# Patient Record
Sex: FEMALE | Race: WHITE | NOT HISPANIC OR LATINO | Employment: OTHER | URBAN - METROPOLITAN AREA
[De-identification: names, ages, dates, MRNs, and addresses within clinical notes are randomized per-mention and may not be internally consistent; named-entity substitution may affect disease eponyms.]

---

## 2021-07-06 ENCOUNTER — EVALUATION (OUTPATIENT)
Dept: PHYSICAL THERAPY | Facility: CLINIC | Age: 71
End: 2021-07-06
Payer: MEDICARE

## 2021-07-06 DIAGNOSIS — I89.0 LYMPHEDEMA OF BOTH LOWER EXTREMITIES: Primary | ICD-10-CM

## 2021-07-06 PROCEDURE — 97162 PT EVAL MOD COMPLEX 30 MIN: CPT | Performed by: PHYSICAL THERAPIST

## 2021-07-06 NOTE — PROGRESS NOTES
PT Evaluation     Today's date: 2021  Patient name: Glendy Ferrara  : 1950  MRN: [id number]  Referring provider: Henri Ha DPM  Dx:   Encounter Diagnosis     ICD-10-CM    1  Lymphedema of both lower extremities  I89 0                   Assessment  Assessment details: Pt presents w/ B LE edema with moderate pitting, blisters on BL anterior shins, and slight skin fibrosis, predominant involvement of B lower legs distal 2/3rds into ankles   R>L girth measurements are noted which fits visual inspection of R>L LE size  PMHx significant for DM2 and other medical history which could have been an indicator and potential compromise of LE lymphatics   Advise initiating full CDT protocol w/ MLD and compression wraps  PT reviewed with patient lymphedema education of skin care including: keeping extremity clean and dry, applying moisturizer daily, special attention to nail care, wearing sunscreen avoiding nicks and skin irritation from razors, wearing gloves with activities that may cause skin injury  Also reviewed for patient to avoid excessive constriction such as tight clothing and jewelry, extreme temperature changes, and the importance of compliance for compression garments  Patient also advised to contact physician if experiencing any constitutional symptoms, swelling, redness, pain, rash, itching, or increased skin temperature       Impairments: abnormal or restricted ROM, activity intolerance, impaired physical strength, lacks appropriate home exercise program and pain with function    Symptom irritability: moderateUnderstanding of Dx/Px/POC: excellent  Goals  ST  Reduce B LE edema >1 cm girth measurement within 2 wks  2  Pt to obtain compression wraps within 1 wk    LT  Reduce B LE girth measurements > 2 cm within 5 wks  2  Pt I in self MLD and transition to phase 2 CDT protocol within 5 wks  3  Pt to report no blisters or increased redness in BL LE by DC       Plan  Patient would benefit from: skilled physical therapy  Planned therapy interventions: abdominal trunk stabilization, manual therapy, massage, neuromuscular re-education, patient education, strengthening, stretching, therapeutic activities, therapeutic exercise, flexibility, gait training and home exercise program  Other planned therapy interventions: MLD, compression wraps   Frequency: 2x week  Duration in weeks: 4  Treatment plan discussed with: patient and family        Subjective Evaluation    History of Present Illness  Mechanism of injury: Pt notes that she has been noticing more swelling in her legs the last year or two  She notes that the blisters have been consistent on the anterior aspect of both of her lower legs  She notes her legs feel heavy if she walks for long periods of time  She does have DM2, but controlled  Pt reports no problems sleeping, but has difficulty bending over sometimes  Denies any numbness/tingling in lower legs  Pt states that she has not had any wounds, but the blisters have not been going away  She states that she does have compression socks, but has not been wearing them      Quality of life: excellent    Pain  Current pain ratin  At best pain ratin  At worst pain ratin  Location: R foot- pt reports shooting pain once in awhile  Aggravating factors: standing and walking    Social Support  Steps to enter house: yes (2 small steps with railing)  Stairs in house: yes (2 to get into the kitchen)   Lives in: multiple-level home  Lives with: spouse and adult children      Diagnostic Tests  No diagnostic tests performed  Treatments  No previous or current treatments  Patient Goals  Patient goals for therapy: decreased edema, decreased pain, increased strength and independence with ADLs/IADLs          Objective       LOWER EXTREMITY LEFT CALCULATIONS      Most Recent Value   Volume LE (mL)  7867   Difference from last visit (mL)   -7867   Difference from first visit (mL)   -7411      LOWER EXTREMITY RIGHT CALCULATIONS      Most Recent Value   Volume LE (mL)  9341   Difference from last visit (mL)   -9341   Difference from first visit (mL)   -9341            Physical assessment: B/L swelling R>L, from ankles to below knee  Palpation: Moderate heaviness B lower legs, worse on R  Skin Mobility: minimal-moderate fibrosis on anterior shins BL  Skin color: increased redness in BL anterior shins  Pittin+  Wound presence: no wounds present, but multiple blisters on BL anterior shins  Wound size: n/a  Wound color: n/a  Stemmer's Sign: (-)     Transfer status: I   Ability to don/doff clothing/garments: I but states that it takes her increased time and is difficult    Toe nail hygiene: normal         Precautions: HNT, DM2, fatty liver     Daily Treatment Diary:      Initial Evaluation Date: 21  Compliance                      Visit Number 1                    Re-Eval  IE                 [facility]   Foto Captured Y                                                Manual                      BL LE MLD -                     Application of compression wraps BL LE -                                           Ther-Ex                                                                                                                                                                                                                                                  Neuro Re-Ed                                                                                                Ther-Act                                                               Modalities

## 2021-07-06 NOTE — LETTER
2021    Thalia Groves DPM  [address] [address]    Patient: Erik Willis   YOB: 1950   Date of Visit: 2021     Encounter Diagnosis     ICD-10-CM    1  Lymphedema of both lower extremities  I89 0        Dear Dr Giovanna Moore: Thank you for your recent referral of Erik Willis  Please review the attached evaluation summary from Elisa's recent visit  Please verify that you agree with the plan of care by signing the attached order  If you have any questions or concerns, please do not hesitate to call  I sincerely appreciate the opportunity to share in the care of one of your patients and hope to have another opportunity to work with you in the near future  Sincerely,    Abbi Martinez, PT      Referring Provider:      I certify that I have read the below Plan of Care and certify the need for these services furnished under this plan of treatment while under my care  Thalia Groves DPM  [address] [address]  Via Fax: [phone]          PT Evaluation     Today's date: 2021  Patient name: Erik Willis  : 1950  MRN: [id number]  Referring provider: Kesha Lara DPM  Dx:   Encounter Diagnosis     ICD-10-CM    1  Lymphedema of both lower extremities  I89 0                   Assessment  Assessment details: Pt presents w/ B LE edema with moderate pitting, blisters on BL anterior shins, and slight skin fibrosis, predominant involvement of B lower legs distal 2/3rds into ankles   R>L girth measurements are noted which fits visual inspection of R>L LE size  PMHx significant for DM2 and other medical history which could have been an indicator and potential compromise of LE lymphatics   Advise initiating full CDT protocol w/ MLD and compression wraps   PT reviewed with patient lymphedema education of skin care including: keeping extremity clean and dry, applying moisturizer daily, special attention to nail care, wearing sunscreen avoiding nicks and skin irritation from razors, wearing gloves with activities that may cause skin injury  Also reviewed for patient to avoid excessive constriction such as tight clothing and jewelry, extreme temperature changes, and the importance of compliance for compression garments  Patient also advised to contact physician if experiencing any constitutional symptoms, swelling, redness, pain, rash, itching, or increased skin temperature       Impairments: abnormal or restricted ROM, activity intolerance, impaired physical strength, lacks appropriate home exercise program and pain with function    Symptom irritability: moderateUnderstanding of Dx/Px/POC: excellent  Goals  ST  Reduce B LE edema >1 cm girth measurement within 2 wks  2  Pt to obtain compression wraps within 1 wk    LT  Reduce B LE girth measurements > 2 cm within 5 wks  2  Pt I in self MLD and transition to phase 2 CDT protocol within 5 wks  3  Pt to report no blisters or increased redness in BL LE by DC  Plan  Patient would benefit from: skilled physical therapy  Planned therapy interventions: abdominal trunk stabilization, manual therapy, massage, neuromuscular re-education, patient education, strengthening, stretching, therapeutic activities, therapeutic exercise, flexibility, gait training and home exercise program  Other planned therapy interventions: MLD, compression wraps   Frequency: 2x week  Duration in weeks: 4  Treatment plan discussed with: patient and family        Subjective Evaluation    History of Present Illness  Mechanism of injury: Pt notes that she has been noticing more swelling in her legs the last year or two  She notes that the blisters have been consistent on the anterior aspect of both of her lower legs  She notes her legs feel heavy if she walks for long periods of time  She does have DM2, but controlled  Pt reports no problems sleeping, but has difficulty bending over sometimes  Denies any numbness/tingling in lower legs  Pt states that she has not had any wounds, but the blisters have not been going away  She states that she does have compression socks, but has not been wearing them  Quality of life: excellent    Pain  Current pain ratin  At best pain ratin  At worst pain ratin  Location: R foot- pt reports shooting pain once in awhile  Aggravating factors: standing and walking    Social Support  Steps to enter house: yes (2 small steps with railing)  Stairs in house: yes (2 to get into the kitchen)   Lives in: multiple-level home  Lives with: spouse and adult children      Diagnostic Tests  No diagnostic tests performed  Treatments  No previous or current treatments  Patient Goals  Patient goals for therapy: decreased edema, decreased pain, increased strength and independence with ADLs/IADLs          Objective       LOWER EXTREMITY LEFT CALCULATIONS      Most Recent Value   Volume LE (mL)  7867   Difference from last visit (mL)   -7867   Difference from first visit (mL)   -7867      LOWER EXTREMITY RIGHT CALCULATIONS      Most Recent Value   Volume LE (mL)  9341   Difference from last visit (mL)   -9341   Difference from first visit (mL)   -9341            Physical assessment: B/L swelling R>L, from ankles to below knee  Palpation: Moderate heaviness B lower legs, worse on R  Skin Mobility: minimal-moderate fibrosis on anterior shins BL  Skin color: increased redness in BL anterior shins  Pittin+  Wound presence: no wounds present, but multiple blisters on BL anterior shins  Wound size: n/a  Wound color: n/a  Stemmer's Sign: (-)     Transfer status: I   Ability to don/doff clothing/garments: I but states that it takes her increased time and is difficult    Toe nail hygiene: normal         Precautions: HNT, DM2, fatty liver     Daily Treatment Diary:      Initial Evaluation Date: 21  Compliance                      Visit Number 1                    Re-Eval  IE [address] Captured Y                           7/6                     Manual                      BL LE MLD -                     Application of compression wraps SUDARSHAN LE -                                           Ther-Ex                                                                                                                                                                                                                                                  Neuro Re-Ed                                                                                                Ther-Act                                                               Modalities

## 2021-07-13 ENCOUNTER — APPOINTMENT (OUTPATIENT)
Dept: PHYSICAL THERAPY | Facility: CLINIC | Age: 71
End: 2021-07-13
Payer: MEDICARE

## 2021-07-14 ENCOUNTER — OFFICE VISIT (OUTPATIENT)
Dept: PHYSICAL THERAPY | Facility: CLINIC | Age: 71
End: 2021-07-14
Payer: MEDICARE

## 2021-07-14 DIAGNOSIS — I89.0 LYMPHEDEMA OF BOTH LOWER EXTREMITIES: Primary | ICD-10-CM

## 2021-07-14 PROCEDURE — 97140 MANUAL THERAPY 1/> REGIONS: CPT | Performed by: PHYSICAL THERAPIST

## 2021-07-14 NOTE — PROGRESS NOTES
Daily Note     Today's date: 2021  Patient name: Michelle Lawrence  : 1950  MRN: [id number]  Referring provider: Nieves Ortega DPM  Dx:   Encounter Diagnosis     ICD-10-CM    1  Lymphedema of both lower extremities  I89 0                   Subjective: Pt notes that he got her bandages yesterday in the mail  Objective: See treatment diary below      Assessment: Tolerated treatment well  Pt tolerated modified MLD to BL LE  Blisters still present on anterior shins, worse on her R LE  Applied farrow wraps to BL LE, and educated pt and son how to properly don/doff them  Discussed adverse s/s of wraps, pt had no questions at this time  Patient would benefit from continued PT to address current limitations  Plan: Continue per plan of care             Precautions: HNT, DM2, fatty liver     Daily Treatment Diary:      Initial Evaluation Date: 21  Compliance                    Visit Number 1 2                   Re-Eval  IE -                MC   Foto Captured Y -                                             Manual                      BL LE MLD -  arb 30'                   Application of compression wraps BL LE - Arb 10'                                         Ther-Ex                                                                                                                                                                                                                                                  Neuro Re-Ed                                                                                                Ther-Act                                                               Modalities

## 2021-07-21 ENCOUNTER — OFFICE VISIT (OUTPATIENT)
Dept: PHYSICAL THERAPY | Facility: CLINIC | Age: 71
End: 2021-07-21
Payer: MEDICARE

## 2021-07-21 DIAGNOSIS — I89.0 LYMPHEDEMA OF BOTH LOWER EXTREMITIES: Primary | ICD-10-CM

## 2021-07-21 PROCEDURE — 97140 MANUAL THERAPY 1/> REGIONS: CPT | Performed by: PHYSICAL THERAPIST

## 2021-07-21 NOTE — PROGRESS NOTES
Daily Note     Today's date: 2021  Patient name: Jeimy Álvarez  : 1950  MRN: [id number]  Referring provider: Lita Luong DPM  Dx:   Encounter Diagnosis     ICD-10-CM    1  Lymphedema of both lower extremities  I89 0                   Subjective: Pt notes that she was able to wear her compression wraps for a whole day last week following the session  She notes that her swelling in her R LE feels a little more today  Objective: See treatment diary below      Assessment: Tolerated treatment well  Pt's blisters on anterior shins BL and redness looked improved today  Swelling still prominent in BL lower legs and into ankles  She tolerated modified MLD without adverse effects  Applied compression wraps to BL lower legs and ankles today, she noted relief following application  Patient would benefit from continued PT to address swelling in BL lower legs  Plan: Continue per plan of care        Precautions: HNT, DM2, fatty liver     Daily Treatment Diary:      Initial Evaluation Date: 21  Compliance                  Visit Number 1 2  3                 Re-Eval  IE -  -              MC   Foto Captured Y - -                                         Manual                      BL LE MLD -  arb 30'  arb 30'                 Application of compression wraps BL LE - Arb 10'  arb 10'                                       Ther-Ex                                                                                                                                                                                                                                                  Neuro Re-Ed                                                                                                Ther-Act                                                               Modalities

## 2021-07-27 ENCOUNTER — OFFICE VISIT (OUTPATIENT)
Dept: PHYSICAL THERAPY | Facility: CLINIC | Age: 71
End: 2021-07-27
Payer: MEDICARE

## 2021-07-27 DIAGNOSIS — I89.0 LYMPHEDEMA OF BOTH LOWER EXTREMITIES: Primary | ICD-10-CM

## 2021-07-27 PROCEDURE — 97140 MANUAL THERAPY 1/> REGIONS: CPT | Performed by: PHYSICAL THERAPIST

## 2021-07-27 NOTE — PROGRESS NOTES
Daily Note     Today's date: 2021  Patient name: Demi Farrar  : 1950  MRN: [id number]  Referring provider: Anny Lopez DPM  Dx:   Encounter Diagnosis     ICD-10-CM    1  Lymphedema of both lower extremities  I89 0                   Subjective: Pt notes that she still is working on building up her tolerance to wear the wraps all day  She gets to a point in the day that they feel like they are pinching, so she has to take them off  Objective: See treatment diary below      Assessment: Tolerated treatment well  Redness was less today in BL lower legs  Continues to have increased swelling around her ankles BL  Pt educated pt to start a wearing schedule for wraps to help with tolerance, she verbalized understanding  Patient would benefit from continued PT to address current limitations  Plan: Continue per plan of care        Precautions: HNT, DM2, fatty liver     Daily Treatment Diary:      Initial Evaluation Date: 21  Compliance                Visit Number 1 2  3  4               Re-Eval  IE -  -  -            MC   Foto Captured Y - -  -                                     Manual                      BL LE MLD -  arb 30'  arb 30'  arb 30'               Application of compression wraps BL LE - Arb 10'  arb 10'  arb 10'                                      Ther-Ex                                                                                                                                                                                                                                                  Neuro Re-Ed                                                                                                Ther-Act                                                               Modalities

## 2021-08-03 ENCOUNTER — OFFICE VISIT (OUTPATIENT)
Dept: PHYSICAL THERAPY | Facility: CLINIC | Age: 71
End: 2021-08-03
Payer: MEDICARE

## 2021-08-03 DIAGNOSIS — I89.0 LYMPHEDEMA OF BOTH LOWER EXTREMITIES: Primary | ICD-10-CM

## 2021-08-03 PROCEDURE — 97140 MANUAL THERAPY 1/> REGIONS: CPT | Performed by: PHYSICAL THERAPIST

## 2021-08-03 NOTE — PROGRESS NOTES
Daily Note/ DC Summary     Today's date: 8/3/2021  Patient name: Ulices Carrasquillo  : 1950  MRN: [id number]  Referring provider: Lilibeth Patterson DPM  Dx:   Encounter Diagnosis     ICD-10-CM    1  Lymphedema of both lower extremities  I89 0                   Subjective: Pt notes that her legs are improving and has been consistently wearing her wraps  Objective: See treatment diary below    LOWER EXTREMITY LEFT CALCULATIONS      Most Recent Value   Volume LE (mL)  7457   Difference from last visit (mL)   410   Difference from first visit (mL)   410   Difference from last visit (%)   5   Difference from first visit (%)   5      LOWER EXTREMITY RIGHT CALCULATIONS      Most Recent Value   Volume LE (mL)  8496   Difference from last visit (mL)   845   Difference from first visit (mL)   845   Difference from last visit (%)   9   Difference from first visit (%)   9            Assessment: Tolerated treatment well  Took measurements today; improvement noted in BL legs with improved skin mobility and color  Pt has other medical issues going on at this time and would like to focus on those issues right now  She states that she is comfortable wearing her wraps daily and will call the clinic with any future questions or concerns  Plan: Pt to be DC from PT formally following today's session        Precautions: HNT, DM2, fatty liver     Daily Treatment Diary:      Initial Evaluation Date: 21  Compliance 7/6  7/14  7/21  7/27  8/3             Visit Number 1 2  3  4  5             Re-Eval  IE -  -  -  -          MC   Foto Captured Y - -  -  Y                      8/3             Manual                      BL LE MLD -  arb 30'  arb 30'  arb 30'  arb 30'             Application of compression wraps BL LE - Arb 10'  arb 10'  arb 10'   arb 10'             Measurements          10'             Ther-Ex Neuro Re-Ed                                                                                                Ther-Act                                                               Modalities

## 2023-01-01 ENCOUNTER — APPOINTMENT (INPATIENT)
Dept: NON INVASIVE DIAGNOSTICS | Facility: HOSPITAL | Age: 73
DRG: 441 | End: 2023-01-01
Payer: MEDICARE

## 2023-01-01 ENCOUNTER — HOSPITAL ENCOUNTER (INPATIENT)
Facility: HOSPITAL | Age: 73
LOS: 3 days | DRG: 441 | End: 2023-09-15
Attending: EMERGENCY MEDICINE | Admitting: ANESTHESIOLOGY
Payer: MEDICARE

## 2023-01-01 ENCOUNTER — APPOINTMENT (INPATIENT)
Dept: RADIOLOGY | Facility: HOSPITAL | Age: 73
DRG: 441 | End: 2023-01-01
Payer: MEDICARE

## 2023-01-01 ENCOUNTER — APPOINTMENT (EMERGENCY)
Dept: CT IMAGING | Facility: HOSPITAL | Age: 73
DRG: 441 | End: 2023-01-01
Payer: MEDICARE

## 2023-01-01 ENCOUNTER — APPOINTMENT (INPATIENT)
Dept: CT IMAGING | Facility: HOSPITAL | Age: 73
DRG: 441 | End: 2023-01-01
Payer: MEDICARE

## 2023-01-01 ENCOUNTER — APPOINTMENT (INPATIENT)
Dept: INTERVENTIONAL RADIOLOGY/VASCULAR | Facility: HOSPITAL | Age: 73
DRG: 441 | End: 2023-01-01
Attending: HOSPITALIST
Payer: MEDICARE

## 2023-01-01 ENCOUNTER — APPOINTMENT (EMERGENCY)
Dept: RADIOLOGY | Facility: HOSPITAL | Age: 73
DRG: 441 | End: 2023-01-01
Payer: MEDICARE

## 2023-01-01 VITALS
OXYGEN SATURATION: 96 % | DIASTOLIC BLOOD PRESSURE: 71 MMHG | RESPIRATION RATE: 31 BRPM | SYSTOLIC BLOOD PRESSURE: 100 MMHG | BODY MASS INDEX: 39.17 KG/M2 | WEIGHT: 249.56 LBS | HEART RATE: 123 BPM | TEMPERATURE: 96.9 F | HEIGHT: 67 IN

## 2023-01-01 DIAGNOSIS — J96.01 ACUTE RESPIRATORY FAILURE WITH HYPOXIA (HCC): Primary | ICD-10-CM

## 2023-01-01 DIAGNOSIS — K72.90 DECOMPENSATED HEPATIC CIRRHOSIS (HCC): ICD-10-CM

## 2023-01-01 DIAGNOSIS — K76.7 HEPATORENAL SYNDROME (HCC): ICD-10-CM

## 2023-01-01 DIAGNOSIS — N17.9 AKI (ACUTE KIDNEY INJURY) (HCC): ICD-10-CM

## 2023-01-01 DIAGNOSIS — K74.60 DECOMPENSATED HEPATIC CIRRHOSIS (HCC): ICD-10-CM

## 2023-01-01 DIAGNOSIS — R18.8 ASCITES: ICD-10-CM

## 2023-01-01 DIAGNOSIS — E86.0 DEHYDRATION: ICD-10-CM

## 2023-01-01 LAB
2HR DELTA HS TROPONIN: -1 NG/L
4HR DELTA HS TROPONIN: -1 NG/L
ALBUMIN FLD-MCNC: <1.5 G/DL
ALBUMIN SERPL BCP-MCNC: 2.9 G/DL (ref 3.5–5)
ALBUMIN SERPL BCP-MCNC: 2.9 G/DL (ref 3.5–5)
ALBUMIN SERPL BCP-MCNC: 3.7 G/DL (ref 3.5–5)
ALP SERPL-CCNC: 104 U/L (ref 34–104)
ALP SERPL-CCNC: 105 U/L (ref 34–104)
ALP SERPL-CCNC: 116 U/L (ref 34–104)
ALT SERPL W P-5'-P-CCNC: 44 U/L (ref 7–52)
ALT SERPL W P-5'-P-CCNC: 45 U/L (ref 7–52)
ALT SERPL W P-5'-P-CCNC: 53 U/L (ref 7–52)
AMMONIA PLAS-SCNC: 58 UMOL/L (ref 18–72)
AMMONIA PLAS-SCNC: 92 UMOL/L (ref 18–72)
AMMONIA PLAS-SCNC: 99 UMOL/L (ref 18–72)
ANION GAP SERPL CALCULATED.3IONS-SCNC: 14 MMOL/L
ANION GAP SERPL CALCULATED.3IONS-SCNC: 15 MMOL/L
ANION GAP SERPL CALCULATED.3IONS-SCNC: 15 MMOL/L
ANION GAP SERPL CALCULATED.3IONS-SCNC: 16 MMOL/L
ANION GAP SERPL CALCULATED.3IONS-SCNC: 17 MMOL/L
ANION GAP SERPL CALCULATED.3IONS-SCNC: 18 MMOL/L
ANION GAP SERPL CALCULATED.3IONS-SCNC: 20 MMOL/L
ANISOCYTOSIS BLD QL SMEAR: PRESENT
AORTIC ROOT: 3.5 CM
AORTIC VALVE MEAN VELOCITY: 10.2 M/S
APICAL FOUR CHAMBER EJECTION FRACTION: 48 %
APPEARANCE FLD: CLEAR
APTT PPP: 23 SECONDS (ref 23–37)
ARTERIAL PATENCY WRIST A: NO
ARTERIAL PATENCY WRIST A: YES
AST SERPL W P-5'-P-CCNC: 114 U/L (ref 13–39)
AST SERPL W P-5'-P-CCNC: 88 U/L (ref 13–39)
AST SERPL W P-5'-P-CCNC: 95 U/L (ref 13–39)
ATRIAL RATE: 136 BPM
ATRIAL RATE: 59 BPM
AV AREA BY CONTINUOUS VTI: 2.1 CM2
AV AREA PEAK VELOCITY: 2.1 CM2
AV LVOT MEAN GRADIENT: 1 MMHG
AV LVOT PEAK GRADIENT: 2 MMHG
AV MEAN GRADIENT: 5 MMHG
AV PEAK GRADIENT: 8 MMHG
AV VALVE AREA: 2.12 CM2
AV VELOCITY RATIO: 0.55
BACTERIA UR QL AUTO: ABNORMAL /HPF
BASE EX.OXY STD BLDV CALC-SCNC: 63.3 % (ref 60–80)
BASE EX.OXY STD BLDV CALC-SCNC: 67.9 % (ref 60–80)
BASE EX.OXY STD BLDV CALC-SCNC: 94.9 % (ref 60–80)
BASE EXCESS BLDA CALC-SCNC: -10.2 MMOL/L
BASE EXCESS BLDA CALC-SCNC: -10.3 MMOL/L
BASE EXCESS BLDA CALC-SCNC: -11.6 MMOL/L
BASE EXCESS BLDA CALC-SCNC: -12.1 MMOL/L
BASE EXCESS BLDA CALC-SCNC: -7 MMOL/L
BASE EXCESS BLDA CALC-SCNC: -9 MMOL/L (ref -2–3)
BASE EXCESS BLDA CALC-SCNC: -9.1 MMOL/L
BASE EXCESS BLDA CALC-SCNC: -9.4 MMOL/L
BASE EXCESS BLDA CALC-SCNC: -9.6 MMOL/L
BASE EXCESS BLDV CALC-SCNC: -12 MMOL/L
BASE EXCESS BLDV CALC-SCNC: -6 MMOL/L
BASE EXCESS BLDV CALC-SCNC: -8.8 MMOL/L
BASOPHILS # BLD AUTO: 0.03 THOUSANDS/ÂΜL (ref 0–0.1)
BASOPHILS # BLD MANUAL: 0 THOUSAND/UL (ref 0–0.1)
BASOPHILS # BLD MANUAL: 0 THOUSAND/UL (ref 0–0.1)
BASOPHILS NFR BLD AUTO: 0 % (ref 0–1)
BASOPHILS NFR MAR MANUAL: 0 % (ref 0–1)
BASOPHILS NFR MAR MANUAL: 0 % (ref 0–1)
BILIRUB SERPL-MCNC: 1.98 MG/DL (ref 0.2–1)
BILIRUB SERPL-MCNC: 2.06 MG/DL (ref 0.2–1)
BILIRUB SERPL-MCNC: 2.82 MG/DL (ref 0.2–1)
BILIRUB UR QL STRIP: ABNORMAL
BODY TEMPERATURE: 98.1 DEGREES FEHRENHEIT
BUN SERPL-MCNC: 36 MG/DL (ref 5–25)
BUN SERPL-MCNC: 39 MG/DL (ref 5–25)
BUN SERPL-MCNC: 43 MG/DL (ref 5–25)
BUN SERPL-MCNC: 52 MG/DL (ref 5–25)
BUN SERPL-MCNC: 53 MG/DL (ref 5–25)
BUN SERPL-MCNC: 63 MG/DL (ref 5–25)
BUN SERPL-MCNC: 74 MG/DL (ref 5–25)
BUN SERPL-MCNC: 75 MG/DL (ref 5–25)
BUN SERPL-MCNC: 78 MG/DL (ref 5–25)
BUN SERPL-MCNC: 83 MG/DL (ref 5–25)
BUN SERPL-MCNC: 83 MG/DL (ref 5–25)
BUN SERPL-MCNC: 85 MG/DL (ref 5–25)
BUN SERPL-MCNC: 85 MG/DL (ref 5–25)
CA-I BLD-SCNC: 1.11 MMOL/L (ref 1.12–1.32)
CA-I BLD-SCNC: 1.11 MMOL/L (ref 1.12–1.32)
CA-I BLD-SCNC: 1.12 MMOL/L (ref 1.12–1.32)
CA-I BLD-SCNC: 1.13 MMOL/L (ref 1.12–1.32)
CA-I BLD-SCNC: 1.14 MMOL/L (ref 1.12–1.32)
CA-I BLD-SCNC: 1.15 MMOL/L (ref 1.12–1.32)
CA-I BLD-SCNC: 1.24 MMOL/L (ref 1.12–1.32)
CA-I BLD-SCNC: 1.24 MMOL/L (ref 1.12–1.32)
CALCIUM ALBUM COR SERPL-MCNC: 9.5 MG/DL (ref 8.3–10.1)
CALCIUM ALBUM COR SERPL-MCNC: 9.7 MG/DL (ref 8.3–10.1)
CALCIUM SERPL-MCNC: 10 MG/DL (ref 8.4–10.2)
CALCIUM SERPL-MCNC: 8.5 MG/DL (ref 8.4–10.2)
CALCIUM SERPL-MCNC: 8.6 MG/DL (ref 8.4–10.2)
CALCIUM SERPL-MCNC: 8.7 MG/DL (ref 8.4–10.2)
CALCIUM SERPL-MCNC: 8.8 MG/DL (ref 8.4–10.2)
CALCIUM SERPL-MCNC: 8.8 MG/DL (ref 8.4–10.2)
CALCIUM SERPL-MCNC: 8.9 MG/DL (ref 8.4–10.2)
CALCIUM SERPL-MCNC: 9.2 MG/DL (ref 8.4–10.2)
CALCIUM SERPL-MCNC: 9.3 MG/DL (ref 8.4–10.2)
CARDIAC TROPONIN I PNL SERPL HS: 13 NG/L
CARDIAC TROPONIN I PNL SERPL HS: 13 NG/L
CARDIAC TROPONIN I PNL SERPL HS: 14 NG/L
CHLORIDE SERPL-SCNC: 103 MMOL/L (ref 96–108)
CHLORIDE SERPL-SCNC: 104 MMOL/L (ref 96–108)
CHLORIDE SERPL-SCNC: 104 MMOL/L (ref 96–108)
CHLORIDE SERPL-SCNC: 105 MMOL/L (ref 96–108)
CHLORIDE SERPL-SCNC: 107 MMOL/L (ref 96–108)
CHLORIDE SERPL-SCNC: 107 MMOL/L (ref 96–108)
CHLORIDE SERPL-SCNC: 109 MMOL/L (ref 96–108)
CHLORIDE SERPL-SCNC: 110 MMOL/L (ref 96–108)
CHLORIDE SERPL-SCNC: 111 MMOL/L (ref 96–108)
CHLORIDE SERPL-SCNC: 111 MMOL/L (ref 96–108)
CHLORIDE SERPL-SCNC: 112 MMOL/L (ref 96–108)
CLARITY UR: ABNORMAL
CO2 SERPL-SCNC: 12 MMOL/L (ref 21–32)
CO2 SERPL-SCNC: 14 MMOL/L (ref 21–32)
CO2 SERPL-SCNC: 14 MMOL/L (ref 21–32)
CO2 SERPL-SCNC: 16 MMOL/L (ref 21–32)
CO2 SERPL-SCNC: 16 MMOL/L (ref 21–32)
CO2 SERPL-SCNC: 17 MMOL/L (ref 21–32)
CO2 SERPL-SCNC: 18 MMOL/L (ref 21–32)
CO2 SERPL-SCNC: 18 MMOL/L (ref 21–32)
CO2 SERPL-SCNC: 19 MMOL/L (ref 21–32)
CO2 SERPL-SCNC: 19 MMOL/L (ref 21–32)
CO2 SERPL-SCNC: 21 MMOL/L (ref 21–32)
COLOR FLD: YELLOW
COLOR UR: ABNORMAL
CREAT SERPL-MCNC: 1.86 MG/DL (ref 0.6–1.3)
CREAT SERPL-MCNC: 2 MG/DL (ref 0.6–1.3)
CREAT SERPL-MCNC: 2.05 MG/DL (ref 0.6–1.3)
CREAT SERPL-MCNC: 2.18 MG/DL (ref 0.6–1.3)
CREAT SERPL-MCNC: 2.26 MG/DL (ref 0.6–1.3)
CREAT SERPL-MCNC: 2.38 MG/DL (ref 0.6–1.3)
CREAT SERPL-MCNC: 2.44 MG/DL (ref 0.6–1.3)
CREAT SERPL-MCNC: 2.49 MG/DL (ref 0.6–1.3)
CREAT SERPL-MCNC: 2.66 MG/DL (ref 0.6–1.3)
CREAT SERPL-MCNC: 2.82 MG/DL (ref 0.6–1.3)
CREAT SERPL-MCNC: 2.84 MG/DL (ref 0.6–1.3)
CREAT SERPL-MCNC: 2.99 MG/DL (ref 0.6–1.3)
CREAT SERPL-MCNC: 3 MG/DL (ref 0.6–1.3)
DOP CALC AO PEAK VEL: 1.43 M/S
DOP CALC AO VTI: 32.84 CM
DOP CALC LVOT AREA: 3.8 CM2
DOP CALC LVOT CARDIAC INDEX: 2.91 L/MIN/M2
DOP CALC LVOT CARDIAC OUTPUT: 6.49 L/MIN
DOP CALC LVOT DIAMETER: 2.2 CM
DOP CALC LVOT PEAK VEL VTI: 18.29 CM
DOP CALC LVOT PEAK VEL: 0.78 M/S
DOP CALC LVOT STROKE INDEX: 31.8 ML/M2
DOP CALC LVOT STROKE VOLUME: 69.49
E WAVE DECELERATION TIME: 161 MS
EOSINOPHIL # BLD AUTO: 0.12 THOUSAND/ÂΜL (ref 0–0.61)
EOSINOPHIL # BLD MANUAL: 0 THOUSAND/UL (ref 0–0.4)
EOSINOPHIL # BLD MANUAL: 0.44 THOUSAND/UL (ref 0–0.4)
EOSINOPHIL NFR BLD AUTO: 1 % (ref 0–6)
EOSINOPHIL NFR BLD MANUAL: 0 % (ref 0–6)
EOSINOPHIL NFR BLD MANUAL: 3 % (ref 0–6)
ERYTHROCYTE [DISTWIDTH] IN BLOOD BY AUTOMATED COUNT: 16.7 % (ref 11.6–15.1)
ERYTHROCYTE [DISTWIDTH] IN BLOOD BY AUTOMATED COUNT: 16.9 % (ref 11.6–15.1)
ERYTHROCYTE [DISTWIDTH] IN BLOOD BY AUTOMATED COUNT: 17 % (ref 11.6–15.1)
ERYTHROCYTE [DISTWIDTH] IN BLOOD BY AUTOMATED COUNT: 17.2 % (ref 11.6–15.1)
ERYTHROCYTE [DISTWIDTH] IN BLOOD BY AUTOMATED COUNT: 17.5 % (ref 11.6–15.1)
FLUAV RNA RESP QL NAA+PROBE: NEGATIVE
FLUBV RNA RESP QL NAA+PROBE: NEGATIVE
FRACTIONAL SHORTENING: 27 (ref 28–44)
GFR SERPL CREATININE-BSD FRML MDRD: 14 ML/MIN/1.73SQ M
GFR SERPL CREATININE-BSD FRML MDRD: 15 ML/MIN/1.73SQ M
GFR SERPL CREATININE-BSD FRML MDRD: 15 ML/MIN/1.73SQ M
GFR SERPL CREATININE-BSD FRML MDRD: 16 ML/MIN/1.73SQ M
GFR SERPL CREATININE-BSD FRML MDRD: 17 ML/MIN/1.73SQ M
GFR SERPL CREATININE-BSD FRML MDRD: 18 ML/MIN/1.73SQ M
GFR SERPL CREATININE-BSD FRML MDRD: 19 ML/MIN/1.73SQ M
GFR SERPL CREATININE-BSD FRML MDRD: 19 ML/MIN/1.73SQ M
GFR SERPL CREATININE-BSD FRML MDRD: 21 ML/MIN/1.73SQ M
GFR SERPL CREATININE-BSD FRML MDRD: 21 ML/MIN/1.73SQ M
GFR SERPL CREATININE-BSD FRML MDRD: 23 ML/MIN/1.73SQ M
GFR SERPL CREATININE-BSD FRML MDRD: 24 ML/MIN/1.73SQ M
GFR SERPL CREATININE-BSD FRML MDRD: 26 ML/MIN/1.73SQ M
GLUCOSE FLD-MCNC: 131 MG/DL
GLUCOSE SERPL-MCNC: 101 MG/DL (ref 65–140)
GLUCOSE SERPL-MCNC: 111 MG/DL (ref 65–140)
GLUCOSE SERPL-MCNC: 113 MG/DL (ref 65–140)
GLUCOSE SERPL-MCNC: 114 MG/DL (ref 65–140)
GLUCOSE SERPL-MCNC: 114 MG/DL (ref 65–140)
GLUCOSE SERPL-MCNC: 117 MG/DL (ref 65–140)
GLUCOSE SERPL-MCNC: 131 MG/DL (ref 65–140)
GLUCOSE SERPL-MCNC: 133 MG/DL (ref 65–140)
GLUCOSE SERPL-MCNC: 144 MG/DL (ref 65–140)
GLUCOSE SERPL-MCNC: 149 MG/DL (ref 65–140)
GLUCOSE SERPL-MCNC: 152 MG/DL (ref 65–140)
GLUCOSE SERPL-MCNC: 155 MG/DL (ref 65–140)
GLUCOSE SERPL-MCNC: 160 MG/DL (ref 65–140)
GLUCOSE SERPL-MCNC: 161 MG/DL (ref 65–140)
GLUCOSE SERPL-MCNC: 162 MG/DL (ref 65–140)
GLUCOSE SERPL-MCNC: 162 MG/DL (ref 65–140)
GLUCOSE SERPL-MCNC: 166 MG/DL (ref 65–140)
GLUCOSE SERPL-MCNC: 167 MG/DL (ref 65–140)
GLUCOSE SERPL-MCNC: 168 MG/DL (ref 65–140)
GLUCOSE SERPL-MCNC: 168 MG/DL (ref 65–140)
GLUCOSE SERPL-MCNC: 170 MG/DL (ref 65–140)
GLUCOSE SERPL-MCNC: 170 MG/DL (ref 65–140)
GLUCOSE SERPL-MCNC: 83 MG/DL (ref 65–140)
GLUCOSE SERPL-MCNC: 92 MG/DL (ref 65–140)
GLUCOSE SERPL-MCNC: 99 MG/DL (ref 65–140)
GLUCOSE UR STRIP-MCNC: NEGATIVE MG/DL
HCO3 BLDA-SCNC: 13.9 MMOL/L (ref 22–28)
HCO3 BLDA-SCNC: 14.3 MMOL/L (ref 22–28)
HCO3 BLDA-SCNC: 16.4 MMOL/L (ref 22–28)
HCO3 BLDA-SCNC: 17.4 MMOL/L (ref 22–28)
HCO3 BLDA-SCNC: 18 MMOL/L (ref 22–28)
HCO3 BLDA-SCNC: 18.1 MMOL/L (ref 22–28)
HCO3 BLDA-SCNC: 18.7 MMOL/L (ref 22–28)
HCO3 BLDA-SCNC: 19 MMOL/L (ref 22–28)
HCO3 BLDA-SCNC: 19.4 MMOL/L (ref 22–28)
HCO3 BLDV-SCNC: 14.8 MMOL/L (ref 24–30)
HCO3 BLDV-SCNC: 18.6 MMOL/L (ref 24–30)
HCO3 BLDV-SCNC: 21.1 MMOL/L (ref 24–30)
HCT VFR BLD AUTO: 31.2 % (ref 34.8–46.1)
HCT VFR BLD AUTO: 33 % (ref 34.8–46.1)
HCT VFR BLD AUTO: 37 % (ref 34.8–46.1)
HCT VFR BLD AUTO: 37 % (ref 34.8–46.1)
HCT VFR BLD AUTO: 40.5 % (ref 34.8–46.1)
HCT VFR BLD CALC: 35 % (ref 34.8–46.1)
HGB BLD-MCNC: 10.5 G/DL (ref 11.5–15.4)
HGB BLD-MCNC: 11.7 G/DL (ref 11.5–15.4)
HGB BLD-MCNC: 11.8 G/DL (ref 11.5–15.4)
HGB BLD-MCNC: 12.3 G/DL (ref 11.5–15.4)
HGB BLD-MCNC: 9.8 G/DL (ref 11.5–15.4)
HGB BLDA-MCNC: 11.9 G/DL (ref 11.5–15.4)
HGB UR QL STRIP.AUTO: ABNORMAL
HISTIOCYTES NFR FLD: 53 %
IMM GRANULOCYTES # BLD AUTO: 0.12 THOUSAND/UL (ref 0–0.2)
IMM GRANULOCYTES NFR BLD AUTO: 1 % (ref 0–2)
INR PPP: 1.51 (ref 0.84–1.19)
INR PPP: 1.6 (ref 0.84–1.19)
INR PPP: 2 (ref 0.84–1.19)
INR PPP: 2.17 (ref 0.84–1.19)
INR PPP: 3.84 (ref 0.84–1.19)
INTERVENTRICULAR SEPTUM IN DIASTOLE (PARASTERNAL SHORT AXIS VIEW): 1.4 CM
INTERVENTRICULAR SEPTUM: 1.4 CM (ref 0.6–1.1)
IPAP: 16
IPAP: 18
KETONES UR STRIP-MCNC: ABNORMAL MG/DL
LAAS-AP2: 22.7 CM2
LAAS-AP4: 22 CM2
LACTATE SERPL-SCNC: 1.9 MMOL/L (ref 0.5–2)
LACTATE SERPL-SCNC: 2.2 MMOL/L (ref 0.5–2)
LACTATE SERPL-SCNC: 2.8 MMOL/L (ref 0.5–2)
LDH FLD L TO P-CCNC: 67 U/L
LEFT ATRIUM SIZE: 4 CM
LEFT ATRIUM VOLUME (MOD BIPLANE): 77 ML
LEFT INTERNAL DIMENSION IN SYSTOLE: 3.3 CM (ref 2.1–4)
LEFT VENTRICLE DIASTOLIC VOLUME (MOD BIPLANE): 119 ML
LEFT VENTRICLE SYSTOLIC VOLUME (MOD BIPLANE): 47 ML
LEFT VENTRICULAR INTERNAL DIMENSION IN DIASTOLE: 4.5 CM (ref 3.5–6)
LEFT VENTRICULAR POSTERIOR WALL IN END DIASTOLE: 1.4 CM
LEFT VENTRICULAR STROKE VOLUME: 49 ML
LEUKOCYTE ESTERASE UR QL STRIP: ABNORMAL
LIPASE SERPL-CCNC: 92 U/L (ref 11–82)
LV EF: 60 %
LVSV (TEICH): 49 ML
LYMPHOCYTES # BLD AUTO: 1.02 THOUSAND/UL (ref 0.6–4.47)
LYMPHOCYTES # BLD AUTO: 1.21 THOUSANDS/ÂΜL (ref 0.6–4.47)
LYMPHOCYTES # BLD AUTO: 10 % (ref 14–44)
LYMPHOCYTES # BLD AUTO: 2.27 THOUSAND/UL (ref 0.6–4.47)
LYMPHOCYTES # BLD AUTO: 7 % (ref 14–44)
LYMPHOCYTES NFR BLD AUTO: 14 %
LYMPHOCYTES NFR BLD AUTO: 9 % (ref 14–44)
MACROCYTES BLD QL AUTO: PRESENT
MACROCYTES BLD QL AUTO: PRESENT
MAGNESIUM SERPL-MCNC: 1.9 MG/DL (ref 1.9–2.7)
MAGNESIUM SERPL-MCNC: 1.9 MG/DL (ref 1.9–2.7)
MAGNESIUM SERPL-MCNC: 2 MG/DL (ref 1.9–2.7)
MAGNESIUM SERPL-MCNC: 2.1 MG/DL (ref 1.9–2.7)
MAGNESIUM SERPL-MCNC: 2.1 MG/DL (ref 1.9–2.7)
MAGNESIUM SERPL-MCNC: 2.2 MG/DL (ref 1.9–2.7)
MAGNESIUM SERPL-MCNC: 2.2 MG/DL (ref 1.9–2.7)
MAGNESIUM SERPL-MCNC: 2.3 MG/DL (ref 1.9–2.7)
MAGNESIUM SERPL-MCNC: 2.7 MG/DL (ref 1.9–2.7)
MCH RBC QN AUTO: 31.6 PG (ref 26.8–34.3)
MCH RBC QN AUTO: 31.7 PG (ref 26.8–34.3)
MCH RBC QN AUTO: 31.7 PG (ref 26.8–34.3)
MCH RBC QN AUTO: 32 PG (ref 26.8–34.3)
MCH RBC QN AUTO: 32.1 PG (ref 26.8–34.3)
MCHC RBC AUTO-ENTMCNC: 30.4 G/DL (ref 31.4–37.4)
MCHC RBC AUTO-ENTMCNC: 31.4 G/DL (ref 31.4–37.4)
MCHC RBC AUTO-ENTMCNC: 31.6 G/DL (ref 31.4–37.4)
MCHC RBC AUTO-ENTMCNC: 31.8 G/DL (ref 31.4–37.4)
MCHC RBC AUTO-ENTMCNC: 31.9 G/DL (ref 31.4–37.4)
MCV RBC AUTO: 100 FL (ref 82–98)
MCV RBC AUTO: 101 FL (ref 82–98)
MCV RBC AUTO: 102 FL (ref 82–98)
MCV RBC AUTO: 104 FL (ref 82–98)
MCV RBC AUTO: 99 FL (ref 82–98)
MONO+MESO NFR FLD MANUAL: 20 %
MONOCYTES # BLD AUTO: 0 THOUSAND/UL (ref 0–1.22)
MONOCYTES # BLD AUTO: 0.82 THOUSAND/ÂΜL (ref 0.17–1.22)
MONOCYTES # BLD AUTO: 1.02 THOUSAND/UL (ref 0–1.22)
MONOCYTES NFR BLD AUTO: 6 % (ref 4–12)
MONOCYTES NFR BLD AUTO: 7 %
MONOCYTES NFR BLD: 0 % (ref 4–12)
MONOCYTES NFR BLD: 7 % (ref 4–12)
MRSA NOSE QL CULT: ABNORMAL
MV E'TISSUE VEL-LAT: 10 CM/S
MV E'TISSUE VEL-SEP: 9 CM/S
MV PEAK A VEL: 0.83 M/S
MV PEAK E VEL: 112 CM/S
MV STENOSIS PRESSURE HALF TIME: 47 MS
MV VALVE AREA P 1/2 METHOD: 4.68
NASAL CANNULA: 5
NEUTROPHILS # BLD AUTO: 11.21 THOUSANDS/ÂΜL (ref 1.85–7.62)
NEUTROPHILS # BLD MANUAL: 12.05 THOUSAND/UL (ref 1.85–7.62)
NEUTROPHILS # BLD MANUAL: 20.43 THOUSAND/UL (ref 1.85–7.62)
NEUTS BAND NFR BLD MANUAL: 1 % (ref 0–8)
NEUTS SEG NFR BLD AUTO: 6 %
NEUTS SEG NFR BLD AUTO: 83 % (ref 43–75)
NEUTS SEG NFR BLD AUTO: 83 % (ref 43–75)
NEUTS SEG NFR BLD AUTO: 89 % (ref 43–75)
NITRITE UR QL STRIP: NEGATIVE
NON VENT- BIPAP: ABNORMAL
NON-SQ EPI CELLS URNS QL MICRO: ABNORMAL /HPF
NRBC BLD AUTO-RTO: 0 /100 WBCS
NRBC BLD AUTO-RTO: 0 /100 WBCS
O2 CT BLDA-SCNC: 14.4 ML/DL (ref 16–23)
O2 CT BLDA-SCNC: 14.9 ML/DL (ref 16–23)
O2 CT BLDA-SCNC: 15 ML/DL (ref 16–23)
O2 CT BLDA-SCNC: 15.1 ML/DL (ref 16–23)
O2 CT BLDA-SCNC: 15.2 ML/DL (ref 16–23)
O2 CT BLDA-SCNC: 15.3 ML/DL (ref 16–23)
O2 CT BLDA-SCNC: 15.5 ML/DL (ref 16–23)
O2 CT BLDA-SCNC: 15.9 ML/DL (ref 16–23)
O2 CT BLDV-SCNC: 10 ML/DL
O2 CT BLDV-SCNC: 11.4 ML/DL
O2 CT BLDV-SCNC: 16.2 ML/DL
OXYHGB MFR BLDA: 85.6 % (ref 94–97)
OXYHGB MFR BLDA: 85.8 % (ref 94–97)
OXYHGB MFR BLDA: 86.1 % (ref 94–97)
OXYHGB MFR BLDA: 87.5 % (ref 94–97)
OXYHGB MFR BLDA: 89.7 % (ref 94–97)
OXYHGB MFR BLDA: 90.5 % (ref 94–97)
OXYHGB MFR BLDA: 91.7 % (ref 94–97)
OXYHGB MFR BLDA: 95.2 % (ref 94–97)
P AXIS: 51 DEGREES
PCO2 BLD: 19 MMOL/L (ref 21–32)
PCO2 BLD: 41.3 MM HG (ref 36–44)
PCO2 BLDA: 30.4 MM HG (ref 36–44)
PCO2 BLDA: 34.8 MM HG (ref 36–44)
PCO2 BLDA: 36.5 MM HG (ref 36–44)
PCO2 BLDA: 41.3 MM HG (ref 36–44)
PCO2 BLDA: 42.5 MM HG (ref 36–44)
PCO2 BLDA: 48.6 MM HG (ref 36–44)
PCO2 BLDA: 49.6 MM HG (ref 36–44)
PCO2 BLDA: 57.1 MM HG (ref 36–44)
PCO2 BLDV: 36.6 MM HG (ref 42–50)
PCO2 BLDV: 46 MM HG (ref 42–50)
PCO2 BLDV: 49.1 MM HG (ref 42–50)
PCO2 TEMP ADJ BLDA: 56.4 MM HG (ref 36–44)
PEEP MAX SETTING VENT: 10 CM[H2O]
PEEP MAX SETTING VENT: 8 CM[H2O]
PH BLD: 7.14 [PH] (ref 7.35–7.45)
PH BLD: 7.25 [PH] (ref 7.35–7.45)
PH BLDA: 7.14 [PH] (ref 7.35–7.45)
PH BLDA: 7.18 [PH] (ref 7.35–7.45)
PH BLDA: 7.2 [PH] (ref 7.35–7.45)
PH BLDA: 7.23 [PH] (ref 7.35–7.45)
PH BLDA: 7.24 [PH] (ref 7.35–7.45)
PH BLDA: 7.27 [PH] (ref 7.35–7.45)
PH BLDA: 7.28 [PH] (ref 7.35–7.45)
PH BLDA: 7.28 [PH] (ref 7.35–7.45)
PH BLDV: 7.22 [PH] (ref 7.3–7.4)
PH BLDV: 7.22 [PH] (ref 7.3–7.4)
PH BLDV: 7.25 [PH] (ref 7.3–7.4)
PH UR STRIP.AUTO: 5 [PH]
PHOSPHATE SERPL-MCNC: 2.9 MG/DL (ref 2.3–4.1)
PHOSPHATE SERPL-MCNC: 2.9 MG/DL (ref 2.3–4.1)
PHOSPHATE SERPL-MCNC: 3.3 MG/DL (ref 2.3–4.1)
PHOSPHATE SERPL-MCNC: 3.4 MG/DL (ref 2.3–4.1)
PHOSPHATE SERPL-MCNC: 4.4 MG/DL (ref 2.3–4.1)
PHOSPHATE SERPL-MCNC: 5.1 MG/DL (ref 2.3–4.1)
PHOSPHATE SERPL-MCNC: 5.2 MG/DL (ref 2.3–4.1)
PHOSPHATE SERPL-MCNC: 5.4 MG/DL (ref 2.3–4.1)
PHOSPHATE SERPL-MCNC: 5.7 MG/DL (ref 2.3–4.1)
PHOSPHATE SERPL-MCNC: 5.9 MG/DL (ref 2.3–4.1)
PHOSPHATE SERPL-MCNC: 6 MG/DL (ref 2.3–4.1)
PHOSPHATE SERPL-MCNC: 6.2 MG/DL (ref 2.3–4.1)
PLATELET # BLD AUTO: 123 THOUSANDS/UL (ref 149–390)
PLATELET # BLD AUTO: 30 THOUSANDS/UL (ref 149–390)
PLATELET # BLD AUTO: 41 THOUSANDS/UL (ref 149–390)
PLATELET # BLD AUTO: 63 THOUSANDS/UL (ref 149–390)
PLATELET # BLD AUTO: 71 THOUSANDS/UL (ref 149–390)
PLATELET # BLD AUTO: 71 THOUSANDS/UL (ref 149–390)
PLATELET BLD QL SMEAR: ABNORMAL
PLATELET BLD QL SMEAR: ADEQUATE
PMV BLD AUTO: 10 FL (ref 8.9–12.7)
PMV BLD AUTO: 11.5 FL (ref 8.9–12.7)
PMV BLD AUTO: 11.7 FL (ref 8.9–12.7)
PMV BLD AUTO: 9.6 FL (ref 8.9–12.7)
PMV BLD AUTO: 9.8 FL (ref 8.9–12.7)
PMV BLD AUTO: 9.8 FL (ref 8.9–12.7)
PO2 BLD: 56 MM HG (ref 75–129)
PO2 BLD: 67.4 MM HG (ref 75–129)
PO2 BLDA: 55.3 MM HG (ref 75–129)
PO2 BLDA: 57.9 MM HG (ref 75–129)
PO2 BLDA: 61.2 MM HG (ref 75–129)
PO2 BLDA: 62.3 MM HG (ref 75–129)
PO2 BLDA: 68.6 MM HG (ref 75–129)
PO2 BLDA: 68.8 MM HG (ref 75–129)
PO2 BLDA: 70.1 MM HG (ref 75–129)
PO2 BLDA: 86.7 MM HG (ref 75–129)
PO2 BLDV: 111 MM HG (ref 35–45)
PO2 BLDV: 36.2 MM HG (ref 35–45)
PO2 BLDV: 38 MM HG (ref 35–45)
POLYCHROMASIA BLD QL SMEAR: PRESENT
POTASSIUM BLD-SCNC: 4.1 MMOL/L (ref 3.5–5.3)
POTASSIUM SERPL-SCNC: 3.8 MMOL/L (ref 3.5–5.3)
POTASSIUM SERPL-SCNC: 4 MMOL/L (ref 3.5–5.3)
POTASSIUM SERPL-SCNC: 4 MMOL/L (ref 3.5–5.3)
POTASSIUM SERPL-SCNC: 4.2 MMOL/L (ref 3.5–5.3)
POTASSIUM SERPL-SCNC: 4.4 MMOL/L (ref 3.5–5.3)
POTASSIUM SERPL-SCNC: 4.5 MMOL/L (ref 3.5–5.3)
POTASSIUM SERPL-SCNC: 4.5 MMOL/L (ref 3.5–5.3)
POTASSIUM SERPL-SCNC: 4.6 MMOL/L (ref 3.5–5.3)
POTASSIUM SERPL-SCNC: 4.8 MMOL/L (ref 3.5–5.3)
POTASSIUM SERPL-SCNC: 4.9 MMOL/L (ref 3.5–5.3)
POTASSIUM SERPL-SCNC: 5.2 MMOL/L (ref 3.5–5.3)
POTASSIUM SERPL-SCNC: 5.3 MMOL/L (ref 3.5–5.3)
POTASSIUM SERPL-SCNC: 5.8 MMOL/L (ref 3.5–5.3)
PR INTERVAL: 186 MS
PROCALCITONIN SERPL-MCNC: 1.88 NG/ML
PROT FLD-MCNC: <3 G/DL
PROT SERPL-MCNC: 6.1 G/DL (ref 6.4–8.4)
PROT SERPL-MCNC: 6.3 G/DL (ref 6.4–8.4)
PROT SERPL-MCNC: 6.3 G/DL (ref 6.4–8.4)
PROT UR STRIP-MCNC: ABNORMAL MG/DL
PROTHROMBIN TIME: 18.5 SECONDS (ref 11.6–14.5)
PROTHROMBIN TIME: 19.4 SECONDS (ref 11.6–14.5)
PROTHROMBIN TIME: 23 SECONDS (ref 11.6–14.5)
PROTHROMBIN TIME: 24.5 SECONDS (ref 11.6–14.5)
PROTHROMBIN TIME: 38 SECONDS (ref 11.6–14.5)
PV PEAK GRADIENT: 4 MMHG
QRS AXIS: 117 DEGREES
QRS AXIS: 67 DEGREES
QRSD INTERVAL: 158 MS
QRSD INTERVAL: 194 MS
QT INTERVAL: 358 MS
QT INTERVAL: 508 MS
QTC INTERVAL: 502 MS
QTC INTERVAL: 556 MS
RA PRESSURE ESTIMATED: 15 MMHG
RBC # BLD AUTO: 3.05 MILLION/UL (ref 3.81–5.12)
RBC # BLD AUTO: 3.28 MILLION/UL (ref 3.81–5.12)
RBC # BLD AUTO: 3.69 MILLION/UL (ref 3.81–5.12)
RBC # BLD AUTO: 3.73 MILLION/UL (ref 3.81–5.12)
RBC # BLD AUTO: 3.88 MILLION/UL (ref 3.81–5.12)
RBC #/AREA URNS AUTO: ABNORMAL /HPF
RBC MORPH BLD: PRESENT
RIGHT ATRIUM AREA SYSTOLE A4C: 25.1 CM2
RIGHT VENTRICLE ID DIMENSION: 5.2 CM
RSV RNA RESP QL NAA+PROBE: NEGATIVE
RV PSP: 60 MMHG
SAO2 % BLD FROM PO2: 84 % (ref 60–85)
SARS-COV-2 RNA RESP QL NAA+PROBE: NEGATIVE
SITE: NORMAL
SL CV LEFT ATRIUM LENGTH A2C: 5.1 CM
SL CV LV EF: 60
SL CV PED ECHO LEFT VENTRICLE DIASTOLIC VOLUME (MOD BIPLANE) 2D: 92 ML
SL CV PED ECHO LEFT VENTRICLE SYSTOLIC VOLUME (MOD BIPLANE) 2D: 43 ML
SODIUM 24H UR-SCNC: <10 MOL/L
SODIUM BLD-SCNC: 145 MMOL/L (ref 136–145)
SODIUM SERPL-SCNC: 138 MMOL/L (ref 135–147)
SODIUM SERPL-SCNC: 138 MMOL/L (ref 135–147)
SODIUM SERPL-SCNC: 139 MMOL/L (ref 135–147)
SODIUM SERPL-SCNC: 140 MMOL/L (ref 135–147)
SODIUM SERPL-SCNC: 140 MMOL/L (ref 135–147)
SODIUM SERPL-SCNC: 141 MMOL/L (ref 135–147)
SODIUM SERPL-SCNC: 141 MMOL/L (ref 135–147)
SODIUM SERPL-SCNC: 143 MMOL/L (ref 135–147)
SODIUM SERPL-SCNC: 143 MMOL/L (ref 135–147)
SODIUM SERPL-SCNC: 144 MMOL/L (ref 135–147)
SODIUM SERPL-SCNC: 144 MMOL/L (ref 135–147)
SP GR UR STRIP.AUTO: >=1.03 (ref 1–1.03)
SPECIMEN SOURCE: ABNORMAL
T WAVE AXIS: 16 DEGREES
T WAVE AXIS: 36 DEGREES
TOTAL CELLS COUNTED SPEC: 100
TR MAX PG: 45 MMHG
TR PEAK VELOCITY: 3.4 M/S
TRICUSPID ANNULAR PLANE SYSTOLIC EXCURSION: 2.4 CM
TRICUSPID VALVE PEAK REGURGITATION VELOCITY: 3.35 M/S
UROBILINOGEN UR QL STRIP.AUTO: 1 E.U./DL
VENT BIPAP FIO2: 100 %
VENT BIPAP FIO2: 70 %
VENT BIPAP FIO2: 70 %
VENT BIPAP FIO2: 80 %
VENT BIPAP FIO2: 80 %
VENT BIPAP FIO2: 90 %
VENTRICULAR RATE: 145 BPM
VENTRICULAR RATE: 59 BPM
WBC # BLD AUTO: 13.51 THOUSAND/UL (ref 4.31–10.16)
WBC # BLD AUTO: 14.52 THOUSAND/UL (ref 4.31–10.16)
WBC # BLD AUTO: 15.73 THOUSAND/UL (ref 4.31–10.16)
WBC # BLD AUTO: 16.65 THOUSAND/UL (ref 4.31–10.16)
WBC # BLD AUTO: 22.7 THOUSAND/UL (ref 4.31–10.16)
WBC # FLD MANUAL: 90 /UL
WBC #/AREA URNS AUTO: ABNORMAL /HPF

## 2023-01-01 PROCEDURE — 82805 BLOOD GASES W/O2 SATURATION: CPT

## 2023-01-01 PROCEDURE — 82948 REAGENT STRIP/BLOOD GLUCOSE: CPT

## 2023-01-01 PROCEDURE — 87081 CULTURE SCREEN ONLY: CPT

## 2023-01-01 PROCEDURE — 88305 TISSUE EXAM BY PATHOLOGIST: CPT | Performed by: PATHOLOGY

## 2023-01-01 PROCEDURE — 94003 VENT MGMT INPAT SUBQ DAY: CPT

## 2023-01-01 PROCEDURE — 85610 PROTHROMBIN TIME: CPT | Performed by: PHYSICIAN ASSISTANT

## 2023-01-01 PROCEDURE — 93005 ELECTROCARDIOGRAM TRACING: CPT

## 2023-01-01 PROCEDURE — 82140 ASSAY OF AMMONIA: CPT

## 2023-01-01 PROCEDURE — 49083 ABD PARACENTESIS W/IMAGING: CPT | Performed by: INTERNAL MEDICINE

## 2023-01-01 PROCEDURE — 90945 DIALYSIS ONE EVALUATION: CPT

## 2023-01-01 PROCEDURE — 82140 ASSAY OF AMMONIA: CPT | Performed by: HOSPITALIST

## 2023-01-01 PROCEDURE — 93971 EXTREMITY STUDY: CPT

## 2023-01-01 PROCEDURE — 76937 US GUIDE VASCULAR ACCESS: CPT | Performed by: PHYSICIAN ASSISTANT

## 2023-01-01 PROCEDURE — 94640 AIRWAY INHALATION TREATMENT: CPT

## 2023-01-01 PROCEDURE — 85610 PROTHROMBIN TIME: CPT

## 2023-01-01 PROCEDURE — 80053 COMPREHEN METABOLIC PANEL: CPT | Performed by: HOSPITALIST

## 2023-01-01 PROCEDURE — 49083 ABD PARACENTESIS W/IMAGING: CPT

## 2023-01-01 PROCEDURE — 85027 COMPLETE CBC AUTOMATED: CPT | Performed by: PHYSICIAN ASSISTANT

## 2023-01-01 PROCEDURE — 84100 ASSAY OF PHOSPHORUS: CPT

## 2023-01-01 PROCEDURE — 99291 CRITICAL CARE FIRST HOUR: CPT | Performed by: PHYSICIAN ASSISTANT

## 2023-01-01 PROCEDURE — 85027 COMPLETE CBC AUTOMATED: CPT | Performed by: HOSPITALIST

## 2023-01-01 PROCEDURE — 80053 COMPREHEN METABOLIC PANEL: CPT

## 2023-01-01 PROCEDURE — 99285 EMERGENCY DEPT VISIT HI MDM: CPT

## 2023-01-01 PROCEDURE — 83690 ASSAY OF LIPASE: CPT | Performed by: EMERGENCY MEDICINE

## 2023-01-01 PROCEDURE — 82330 ASSAY OF CALCIUM: CPT

## 2023-01-01 PROCEDURE — 80048 BASIC METABOLIC PNL TOTAL CA: CPT | Performed by: INTERNAL MEDICINE

## 2023-01-01 PROCEDURE — 85610 PROTHROMBIN TIME: CPT | Performed by: HOSPITALIST

## 2023-01-01 PROCEDURE — NC001 PR NO CHARGE: Performed by: PHYSICIAN ASSISTANT

## 2023-01-01 PROCEDURE — 5A1D90Z PERFORMANCE OF URINARY FILTRATION, CONTINUOUS, GREATER THAN 18 HOURS PER DAY: ICD-10-PCS | Performed by: INTERNAL MEDICINE

## 2023-01-01 PROCEDURE — 94760 N-INVAS EAR/PLS OXIMETRY 1: CPT

## 2023-01-01 PROCEDURE — 83735 ASSAY OF MAGNESIUM: CPT

## 2023-01-01 PROCEDURE — 96365 THER/PROPH/DIAG IV INF INIT: CPT

## 2023-01-01 PROCEDURE — 87147 CULTURE TYPE IMMUNOLOGIC: CPT | Performed by: HOSPITALIST

## 2023-01-01 PROCEDURE — 94664 DEMO&/EVAL PT USE INHALER: CPT

## 2023-01-01 PROCEDURE — 85007 BL SMEAR W/DIFF WBC COUNT: CPT | Performed by: PHYSICIAN ASSISTANT

## 2023-01-01 PROCEDURE — G1004 CDSM NDSC: HCPCS

## 2023-01-01 PROCEDURE — 85007 BL SMEAR W/DIFF WBC COUNT: CPT

## 2023-01-01 PROCEDURE — 0W9G3ZZ DRAINAGE OF PERITONEAL CAVITY, PERCUTANEOUS APPROACH: ICD-10-PCS | Performed by: INTERNAL MEDICINE

## 2023-01-01 PROCEDURE — 84145 PROCALCITONIN (PCT): CPT | Performed by: ANESTHESIOLOGY

## 2023-01-01 PROCEDURE — 84157 ASSAY OF PROTEIN OTHER: CPT | Performed by: HOSPITALIST

## 2023-01-01 PROCEDURE — 84100 ASSAY OF PHOSPHORUS: CPT | Performed by: HOSPITALIST

## 2023-01-01 PROCEDURE — 82330 ASSAY OF CALCIUM: CPT | Performed by: INTERNAL MEDICINE

## 2023-01-01 PROCEDURE — 82805 BLOOD GASES W/O2 SATURATION: CPT | Performed by: PHYSICIAN ASSISTANT

## 2023-01-01 PROCEDURE — 99291 CRITICAL CARE FIRST HOUR: CPT | Performed by: ANESTHESIOLOGY

## 2023-01-01 PROCEDURE — 36600 WITHDRAWAL OF ARTERIAL BLOOD: CPT

## 2023-01-01 PROCEDURE — 02HV33Z INSERTION OF INFUSION DEVICE INTO SUPERIOR VENA CAVA, PERCUTANEOUS APPROACH: ICD-10-PCS | Performed by: ANESTHESIOLOGY

## 2023-01-01 PROCEDURE — 83605 ASSAY OF LACTIC ACID: CPT | Performed by: EMERGENCY MEDICINE

## 2023-01-01 PROCEDURE — 82945 GLUCOSE OTHER FLUID: CPT | Performed by: HOSPITALIST

## 2023-01-01 PROCEDURE — 88112 CYTOPATH CELL ENHANCE TECH: CPT | Performed by: PATHOLOGY

## 2023-01-01 PROCEDURE — 80048 BASIC METABOLIC PNL TOTAL CA: CPT

## 2023-01-01 PROCEDURE — 87070 CULTURE OTHR SPECIMN AEROBIC: CPT | Performed by: HOSPITALIST

## 2023-01-01 PROCEDURE — 82042 OTHER SOURCE ALBUMIN QUAN EA: CPT | Performed by: HOSPITALIST

## 2023-01-01 PROCEDURE — 70450 CT HEAD/BRAIN W/O DYE: CPT

## 2023-01-01 PROCEDURE — 87040 BLOOD CULTURE FOR BACTERIA: CPT | Performed by: EMERGENCY MEDICINE

## 2023-01-01 PROCEDURE — 99233 SBSQ HOSP IP/OBS HIGH 50: CPT | Performed by: INTERNAL MEDICINE

## 2023-01-01 PROCEDURE — 83735 ASSAY OF MAGNESIUM: CPT | Performed by: INTERNAL MEDICINE

## 2023-01-01 PROCEDURE — 0241U HB NFCT DS VIR RESP RNA 4 TRGT: CPT | Performed by: EMERGENCY MEDICINE

## 2023-01-01 PROCEDURE — 93306 TTE W/DOPPLER COMPLETE: CPT | Performed by: STUDENT IN AN ORGANIZED HEALTH CARE EDUCATION/TRAINING PROGRAM

## 2023-01-01 PROCEDURE — 96375 TX/PRO/DX INJ NEW DRUG ADDON: CPT

## 2023-01-01 PROCEDURE — 83605 ASSAY OF LACTIC ACID: CPT | Performed by: HOSPITALIST

## 2023-01-01 PROCEDURE — 99285 EMERGENCY DEPT VISIT HI MDM: CPT | Performed by: EMERGENCY MEDICINE

## 2023-01-01 PROCEDURE — 99238 HOSP IP/OBS DSCHRG MGMT 30/<: CPT | Performed by: PHYSICIAN ASSISTANT

## 2023-01-01 PROCEDURE — 4A133B1 MONITORING OF ARTERIAL PRESSURE, PERIPHERAL, PERCUTANEOUS APPROACH: ICD-10-PCS | Performed by: ANESTHESIOLOGY

## 2023-01-01 PROCEDURE — 82947 ASSAY GLUCOSE BLOOD QUANT: CPT

## 2023-01-01 PROCEDURE — 92610 EVALUATE SWALLOWING FUNCTION: CPT

## 2023-01-01 PROCEDURE — 82803 BLOOD GASES ANY COMBINATION: CPT

## 2023-01-01 PROCEDURE — C9113 INJ PANTOPRAZOLE SODIUM, VIA: HCPCS | Performed by: PHYSICIAN ASSISTANT

## 2023-01-01 PROCEDURE — 83615 LACTATE (LD) (LDH) ENZYME: CPT | Performed by: HOSPITALIST

## 2023-01-01 PROCEDURE — 83735 ASSAY OF MAGNESIUM: CPT | Performed by: EMERGENCY MEDICINE

## 2023-01-01 PROCEDURE — 85014 HEMATOCRIT: CPT

## 2023-01-01 PROCEDURE — 99233 SBSQ HOSP IP/OBS HIGH 50: CPT | Performed by: HOSPITALIST

## 2023-01-01 PROCEDURE — 94645 CONT INHLJ TX EACH ADDL HOUR: CPT

## 2023-01-01 PROCEDURE — 36556 INSERT NON-TUNNEL CV CATH: CPT | Performed by: PHYSICIAN ASSISTANT

## 2023-01-01 PROCEDURE — 96368 THER/DIAG CONCURRENT INF: CPT

## 2023-01-01 PROCEDURE — 94002 VENT MGMT INPAT INIT DAY: CPT

## 2023-01-01 PROCEDURE — 84484 ASSAY OF TROPONIN QUANT: CPT | Performed by: HOSPITALIST

## 2023-01-01 PROCEDURE — 85049 AUTOMATED PLATELET COUNT: CPT | Performed by: HOSPITALIST

## 2023-01-01 PROCEDURE — 84300 ASSAY OF URINE SODIUM: CPT | Performed by: INTERNAL MEDICINE

## 2023-01-01 PROCEDURE — 85027 COMPLETE CBC AUTOMATED: CPT

## 2023-01-01 PROCEDURE — 03HY32Z INSERTION OF MONITORING DEVICE INTO UPPER ARTERY, PERCUTANEOUS APPROACH: ICD-10-PCS | Performed by: ANESTHESIOLOGY

## 2023-01-01 PROCEDURE — 85610 PROTHROMBIN TIME: CPT | Performed by: EMERGENCY MEDICINE

## 2023-01-01 PROCEDURE — 71045 X-RAY EXAM CHEST 1 VIEW: CPT

## 2023-01-01 PROCEDURE — 84100 ASSAY OF PHOSPHORUS: CPT | Performed by: INTERNAL MEDICINE

## 2023-01-01 PROCEDURE — 99223 1ST HOSP IP/OBS HIGH 75: CPT | Performed by: INTERNAL MEDICINE

## 2023-01-01 PROCEDURE — 74176 CT ABD & PELVIS W/O CONTRAST: CPT

## 2023-01-01 PROCEDURE — 81001 URINALYSIS AUTO W/SCOPE: CPT | Performed by: HOSPITALIST

## 2023-01-01 PROCEDURE — 36415 COLL VENOUS BLD VENIPUNCTURE: CPT | Performed by: EMERGENCY MEDICINE

## 2023-01-01 PROCEDURE — 84484 ASSAY OF TROPONIN QUANT: CPT | Performed by: EMERGENCY MEDICINE

## 2023-01-01 PROCEDURE — 87081 CULTURE SCREEN ONLY: CPT | Performed by: HOSPITALIST

## 2023-01-01 PROCEDURE — 99223 1ST HOSP IP/OBS HIGH 75: CPT | Performed by: HOSPITALIST

## 2023-01-01 PROCEDURE — 99497 ADVNCD CARE PLAN 30 MIN: CPT | Performed by: PHYSICIAN ASSISTANT

## 2023-01-01 PROCEDURE — 94644 CONT INHLJ TX 1ST HOUR: CPT

## 2023-01-01 PROCEDURE — 83735 ASSAY OF MAGNESIUM: CPT | Performed by: HOSPITALIST

## 2023-01-01 PROCEDURE — 4A133J1 MONITORING OF ARTERIAL PULSE, PERIPHERAL, PERCUTANEOUS APPROACH: ICD-10-PCS | Performed by: ANESTHESIOLOGY

## 2023-01-01 PROCEDURE — 93971 EXTREMITY STUDY: CPT | Performed by: SURGERY

## 2023-01-01 PROCEDURE — 89051 BODY FLUID CELL COUNT: CPT | Performed by: HOSPITALIST

## 2023-01-01 PROCEDURE — 99233 SBSQ HOSP IP/OBS HIGH 50: CPT | Performed by: ANESTHESIOLOGY

## 2023-01-01 PROCEDURE — 80053 COMPREHEN METABOLIC PANEL: CPT | Performed by: EMERGENCY MEDICINE

## 2023-01-01 PROCEDURE — NC001 PR NO CHARGE

## 2023-01-01 PROCEDURE — 85730 THROMBOPLASTIN TIME PARTIAL: CPT | Performed by: EMERGENCY MEDICINE

## 2023-01-01 PROCEDURE — 36620 INSERTION CATHETER ARTERY: CPT | Performed by: PHYSICIAN ASSISTANT

## 2023-01-01 PROCEDURE — 85025 COMPLETE CBC W/AUTO DIFF WBC: CPT | Performed by: EMERGENCY MEDICINE

## 2023-01-01 PROCEDURE — 84132 ASSAY OF SERUM POTASSIUM: CPT

## 2023-01-01 PROCEDURE — 87147 CULTURE TYPE IMMUNOLOGIC: CPT

## 2023-01-01 PROCEDURE — 93306 TTE W/DOPPLER COMPLETE: CPT

## 2023-01-01 PROCEDURE — 87205 SMEAR GRAM STAIN: CPT | Performed by: HOSPITALIST

## 2023-01-01 PROCEDURE — 84295 ASSAY OF SERUM SODIUM: CPT

## 2023-01-01 RX ORDER — LIDOCAINE WITH 8.4% SOD BICARB 0.9%(10ML)
SYRINGE (ML) INJECTION AS NEEDED
Status: COMPLETED | OUTPATIENT
Start: 2023-01-01 | End: 2023-01-01

## 2023-01-01 RX ORDER — CALCIUM GLUCONATE 20 MG/ML
1 INJECTION, SOLUTION INTRAVENOUS ONCE
Status: COMPLETED | OUTPATIENT
Start: 2023-01-01 | End: 2023-01-01

## 2023-01-01 RX ORDER — SACCHAROMYCES BOULARDII 250 MG
250 CAPSULE ORAL DAILY
COMMUNITY
End: 2023-01-01

## 2023-01-01 RX ORDER — FLUTICASONE PROPIONATE 50 MCG
1 SPRAY, SUSPENSION (ML) NASAL DAILY
Status: DISCONTINUED | OUTPATIENT
Start: 2023-01-01 | End: 2023-01-01

## 2023-01-01 RX ORDER — POTASSIUM CHLORIDE 14.9 MG/ML
20 INJECTION INTRAVENOUS
Status: COMPLETED | OUTPATIENT
Start: 2023-01-01 | End: 2023-01-01

## 2023-01-01 RX ORDER — MAGNESIUM SULFATE 1 G/100ML
1 INJECTION INTRAVENOUS ONCE
Status: DISCONTINUED | OUTPATIENT
Start: 2023-01-01 | End: 2023-01-01

## 2023-01-01 RX ORDER — HEPARIN SODIUM 5000 [USP'U]/ML
5000 INJECTION, SOLUTION INTRAVENOUS; SUBCUTANEOUS EVERY 8 HOURS SCHEDULED
Status: DISCONTINUED | OUTPATIENT
Start: 2023-01-01 | End: 2023-01-01

## 2023-01-01 RX ORDER — PHENOL 1.4 %
600 AEROSOL, SPRAY (ML) MUCOUS MEMBRANE DAILY
COMMUNITY
End: 2023-01-01

## 2023-01-01 RX ORDER — ONDANSETRON 4 MG/1
4 TABLET, FILM COATED ORAL EVERY 8 HOURS PRN
COMMUNITY
End: 2023-01-01

## 2023-01-01 RX ORDER — MONTELUKAST SODIUM 10 MG/1
10 TABLET ORAL DAILY
Status: DISCONTINUED | OUTPATIENT
Start: 2023-01-01 | End: 2023-01-01

## 2023-01-01 RX ORDER — ATENOLOL 50 MG/1
50 TABLET ORAL DAILY
COMMUNITY
End: 2023-01-01

## 2023-01-01 RX ORDER — LORATADINE 10 MG/1
10 TABLET ORAL DAILY
Status: DISCONTINUED | OUTPATIENT
Start: 2023-01-01 | End: 2023-01-01

## 2023-01-01 RX ORDER — FERROUS SULFATE 325(65) MG
325 TABLET ORAL
COMMUNITY
End: 2023-01-01

## 2023-01-01 RX ORDER — BISACODYL 10 MG
10 SUPPOSITORY, RECTAL RECTAL AS NEEDED
COMMUNITY
End: 2023-01-01

## 2023-01-01 RX ORDER — SERTRALINE HYDROCHLORIDE 100 MG/1
200 TABLET, FILM COATED ORAL DAILY
COMMUNITY
End: 2023-01-01

## 2023-01-01 RX ORDER — MIDODRINE HYDROCHLORIDE 5 MG/1
5 TABLET ORAL
Status: DISCONTINUED | OUTPATIENT
Start: 2023-01-01 | End: 2023-01-01

## 2023-01-01 RX ORDER — ASPIRIN 81 MG/1
81 TABLET ORAL DAILY
Status: ON HOLD | COMMUNITY
End: 2023-01-01 | Stop reason: SDUPTHER

## 2023-01-01 RX ORDER — CEFTRIAXONE 1 G/50ML
1000 INJECTION, SOLUTION INTRAVENOUS ONCE
Status: COMPLETED | OUTPATIENT
Start: 2023-01-01 | End: 2023-01-01

## 2023-01-01 RX ORDER — ASPIRIN 81 MG/1
81 TABLET, CHEWABLE ORAL DAILY
COMMUNITY
End: 2023-01-01

## 2023-01-01 RX ORDER — OMEGA-3S/DHA/EPA/FISH OIL/D3 300MG-1000
200 CAPSULE ORAL DAILY
COMMUNITY
End: 2023-01-01

## 2023-01-01 RX ORDER — FLUTICASONE PROPIONATE 50 MCG
1 SPRAY, SUSPENSION (ML) NASAL DAILY
COMMUNITY
End: 2023-01-01

## 2023-01-01 RX ORDER — GLYCOPYRROLATE 0.2 MG/ML
0.2 INJECTION INTRAMUSCULAR; INTRAVENOUS EVERY 4 HOURS PRN
Status: DISCONTINUED | OUTPATIENT
Start: 2023-01-01 | End: 2023-01-01 | Stop reason: HOSPADM

## 2023-01-01 RX ORDER — ONDANSETRON 2 MG/ML
4 INJECTION INTRAMUSCULAR; INTRAVENOUS ONCE
Status: COMPLETED | OUTPATIENT
Start: 2023-01-01 | End: 2023-01-01

## 2023-01-01 RX ORDER — ALBUTEROL SULFATE 90 UG/1
2 AEROSOL, METERED RESPIRATORY (INHALATION) EVERY 4 HOURS PRN
Status: DISCONTINUED | OUTPATIENT
Start: 2023-01-01 | End: 2023-01-01

## 2023-01-01 RX ORDER — EPINEPHRINE 1 MG/ML
INJECTION, SOLUTION, CONCENTRATE INTRAVENOUS
Status: COMPLETED
Start: 2023-01-01 | End: 2023-01-01

## 2023-01-01 RX ORDER — ALBUTEROL SULFATE 90 UG/1
2 AEROSOL, METERED RESPIRATORY (INHALATION) EVERY 4 HOURS PRN
COMMUNITY
End: 2023-01-01

## 2023-01-01 RX ORDER — LACTULOSE 20 G/30ML
30 SOLUTION ORAL 3 TIMES DAILY
Status: DISCONTINUED | OUTPATIENT
Start: 2023-01-01 | End: 2023-01-01

## 2023-01-01 RX ORDER — MAGNESIUM SULFATE 1 G/100ML
1 INJECTION INTRAVENOUS ONCE
Status: COMPLETED | OUTPATIENT
Start: 2023-01-01 | End: 2023-01-01

## 2023-01-01 RX ORDER — MONTELUKAST SODIUM 10 MG/1
1 TABLET ORAL DAILY
COMMUNITY
Start: 2023-01-01 | End: 2023-01-01

## 2023-01-01 RX ORDER — INSULIN LISPRO 100 [IU]/ML
2-12 INJECTION, SOLUTION INTRAVENOUS; SUBCUTANEOUS
Status: DISCONTINUED | OUTPATIENT
Start: 2023-01-01 | End: 2023-01-01

## 2023-01-01 RX ORDER — BISACODYL 10 MG
10 SUPPOSITORY, RECTAL RECTAL AS NEEDED
Status: DISCONTINUED | OUTPATIENT
Start: 2023-01-01 | End: 2023-01-01

## 2023-01-01 RX ORDER — ONDANSETRON 4 MG/1
4 TABLET, ORALLY DISINTEGRATING ORAL EVERY 8 HOURS PRN
Status: DISCONTINUED | OUTPATIENT
Start: 2023-01-01 | End: 2023-01-01

## 2023-01-01 RX ORDER — FERROUS SULFATE 325(65) MG
325 TABLET ORAL DAILY
Status: ON HOLD | COMMUNITY
End: 2023-01-01 | Stop reason: SDUPTHER

## 2023-01-01 RX ORDER — HALOPERIDOL 5 MG/ML
0.5 INJECTION INTRAMUSCULAR EVERY 2 HOUR PRN
Status: DISCONTINUED | OUTPATIENT
Start: 2023-01-01 | End: 2023-01-01 | Stop reason: HOSPADM

## 2023-01-01 RX ORDER — CEFTRIAXONE 1 G/50ML
1000 INJECTION, SOLUTION INTRAVENOUS EVERY 24 HOURS
Status: DISCONTINUED | OUTPATIENT
Start: 2023-01-01 | End: 2023-01-01

## 2023-01-01 RX ORDER — OCTREOTIDE ACETATE 100 UG/ML
100 INJECTION, SOLUTION INTRAVENOUS; SUBCUTANEOUS EVERY 8 HOURS SCHEDULED
Status: DISCONTINUED | OUTPATIENT
Start: 2023-01-01 | End: 2023-01-01

## 2023-01-01 RX ORDER — PANTOPRAZOLE SODIUM 20 MG/1
20 TABLET, DELAYED RELEASE ORAL
Status: DISCONTINUED | OUTPATIENT
Start: 2023-01-01 | End: 2023-01-01

## 2023-01-01 RX ORDER — HYDROMORPHONE HCL/PF 1 MG/ML
1 SYRINGE (ML) INJECTION EVERY 2 HOUR PRN
Status: DISCONTINUED | OUTPATIENT
Start: 2023-01-01 | End: 2023-01-01 | Stop reason: HOSPADM

## 2023-01-01 RX ORDER — FUROSEMIDE 10 MG/ML
80 INJECTION INTRAMUSCULAR; INTRAVENOUS
Status: DISCONTINUED | OUTPATIENT
Start: 2023-01-01 | End: 2023-01-01

## 2023-01-01 RX ORDER — INSULIN LISPRO 100 [IU]/ML
2-12 INJECTION, SOLUTION INTRAVENOUS; SUBCUTANEOUS EVERY 6 HOURS
Status: DISCONTINUED | OUTPATIENT
Start: 2023-01-01 | End: 2023-01-01

## 2023-01-01 RX ORDER — CALCIUM CHLORIDE 100 MG/ML
1 INJECTION INTRAVENOUS; INTRAVENTRICULAR ONCE
Status: COMPLETED | OUTPATIENT
Start: 2023-01-01 | End: 2023-01-01

## 2023-01-01 RX ORDER — MAGNESIUM SULFATE HEPTAHYDRATE 500 MG/ML
1 INJECTION, SOLUTION INTRAMUSCULAR; INTRAVENOUS ONCE
Status: COMPLETED | OUTPATIENT
Start: 2023-01-01 | End: 2023-01-01

## 2023-01-01 RX ORDER — PANTOPRAZOLE SODIUM 40 MG/10ML
40 INJECTION, POWDER, LYOPHILIZED, FOR SOLUTION INTRAVENOUS
Status: DISCONTINUED | OUTPATIENT
Start: 2023-01-01 | End: 2023-01-01

## 2023-01-01 RX ORDER — SERTRALINE HYDROCHLORIDE 100 MG/1
200 TABLET, FILM COATED ORAL DAILY
Status: DISCONTINUED | OUTPATIENT
Start: 2023-01-01 | End: 2023-01-01

## 2023-01-01 RX ORDER — CALCIUM GLUCONATE 20 MG/ML
1 INJECTION, SOLUTION INTRAVENOUS ONCE
Status: DISCONTINUED | OUTPATIENT
Start: 2023-01-01 | End: 2023-01-01

## 2023-01-01 RX ORDER — AMIODARONE HYDROCHLORIDE 50 MG/ML
150 INJECTION, SOLUTION INTRAVENOUS ONCE
Status: COMPLETED | OUTPATIENT
Start: 2023-01-01 | End: 2023-01-01

## 2023-01-01 RX ORDER — CHLORHEXIDINE GLUCONATE ORAL RINSE 1.2 MG/ML
15 SOLUTION DENTAL EVERY 12 HOURS SCHEDULED
Status: DISCONTINUED | OUTPATIENT
Start: 2023-01-01 | End: 2023-01-01

## 2023-01-01 RX ORDER — ACETAMINOPHEN 325 MG/1
650 TABLET ORAL EVERY 4 HOURS PRN
COMMUNITY
End: 2023-01-01

## 2023-01-01 RX ORDER — OMEPRAZOLE 20 MG/1
20 CAPSULE, DELAYED RELEASE ORAL DAILY
COMMUNITY
End: 2023-01-01

## 2023-01-01 RX ORDER — ALBUMIN (HUMAN) 12.5 G/50ML
25 SOLUTION INTRAVENOUS 3 TIMES DAILY
Status: DISCONTINUED | OUTPATIENT
Start: 2023-01-01 | End: 2023-01-01

## 2023-01-01 RX ORDER — LORATADINE 10 MG/1
10 TABLET ORAL DAILY
COMMUNITY
End: 2023-01-01

## 2023-01-01 RX ORDER — SACCHAROMYCES BOULARDII 250 MG
250 CAPSULE ORAL DAILY
Status: DISCONTINUED | OUTPATIENT
Start: 2023-01-01 | End: 2023-01-01

## 2023-01-01 RX ORDER — LISINOPRIL 20 MG/1
20 TABLET ORAL DAILY
Status: ON HOLD | COMMUNITY
Start: 2023-01-01 | End: 2023-01-01 | Stop reason: SDUPTHER

## 2023-01-01 RX ORDER — LISINOPRIL 20 MG/1
20 TABLET ORAL DAILY
COMMUNITY
End: 2023-01-01

## 2023-01-01 RX ORDER — LACTULOSE 20 G/30ML
20 SOLUTION ORAL 2 TIMES DAILY
Status: DISCONTINUED | OUTPATIENT
Start: 2023-01-01 | End: 2023-01-01

## 2023-01-01 RX ORDER — LORAZEPAM 2 MG/ML
1 INJECTION INTRAMUSCULAR
Status: DISCONTINUED | OUTPATIENT
Start: 2023-01-01 | End: 2023-01-01 | Stop reason: HOSPADM

## 2023-01-01 RX ADMIN — ALBUMIN (HUMAN) 25 G: 0.25 INJECTION, SOLUTION INTRAVENOUS at 13:13

## 2023-01-01 RX ADMIN — CEFTRIAXONE 1000 MG: 1 INJECTION, SOLUTION INTRAVENOUS at 14:09

## 2023-01-01 RX ADMIN — LACTULOSE 200 G: 10 SOLUTION ORAL at 17:30

## 2023-01-01 RX ADMIN — PIPERACILLIN AND TAZOBACTAM 3.38 G: 36; 4.5 INJECTION, POWDER, FOR SOLUTION INTRAVENOUS at 11:13

## 2023-01-01 RX ADMIN — ALBUMIN (HUMAN) 25 G: 0.25 INJECTION, SOLUTION INTRAVENOUS at 18:52

## 2023-01-01 RX ADMIN — VASOPRESSIN 0.04 UNITS/MIN: 20 INJECTION INTRAVENOUS at 17:18

## 2023-01-01 RX ADMIN — VASOPRESSIN 0.08 UNITS/MIN: 20 INJECTION INTRAVENOUS at 16:42

## 2023-01-01 RX ADMIN — Medication 30 MCG/MIN: at 13:30

## 2023-01-01 RX ADMIN — Medication 24 MCG/MIN: at 00:24

## 2023-01-01 RX ADMIN — CALCIUM CHLORIDE 1 G: 100 INJECTION INTRAVENOUS; INTRAVENTRICULAR at 12:07

## 2023-01-01 RX ADMIN — Medication 10 ML: at 15:39

## 2023-01-01 RX ADMIN — HYDROCORTISONE SODIUM SUCCINATE 50 MG: 100 INJECTION, POWDER, FOR SOLUTION INTRAMUSCULAR; INTRAVENOUS at 10:39

## 2023-01-01 RX ADMIN — VANCOMYCIN HYDROCHLORIDE 1250 MG: 1 INJECTION, POWDER, LYOPHILIZED, FOR SOLUTION INTRAVENOUS at 08:01

## 2023-01-01 RX ADMIN — CALCIUM GLUCONATE 1 G: 20 INJECTION, SOLUTION INTRAVENOUS at 06:00

## 2023-01-01 RX ADMIN — PIPERACILLIN AND TAZOBACTAM 3.38 G: 36; 4.5 INJECTION, POWDER, FOR SOLUTION INTRAVENOUS at 09:46

## 2023-01-01 RX ADMIN — OCTREOTIDE ACETATE 100 MCG: 100 INJECTION, SOLUTION INTRAVENOUS; SUBCUTANEOUS at 06:25

## 2023-01-01 RX ADMIN — PIPERACILLIN AND TAZOBACTAM 3.38 G: 36; 4.5 INJECTION, POWDER, FOR SOLUTION INTRAVENOUS at 23:00

## 2023-01-01 RX ADMIN — HYDROMORPHONE HYDROCHLORIDE 1 MG: 1 INJECTION, SOLUTION INTRAMUSCULAR; INTRAVENOUS; SUBCUTANEOUS at 19:30

## 2023-01-01 RX ADMIN — SODIUM BICARBONATE 50 MEQ: 84 INJECTION, SOLUTION INTRAVENOUS at 19:55

## 2023-01-01 RX ADMIN — FUROSEMIDE 80 MG: 10 INJECTION, SOLUTION INTRAMUSCULAR; INTRAVENOUS at 21:11

## 2023-01-01 RX ADMIN — ALBUMIN (HUMAN) 25 G: 0.25 INJECTION, SOLUTION INTRAVENOUS at 22:11

## 2023-01-01 RX ADMIN — HEPARIN SODIUM 5000 UNITS: 5000 INJECTION INTRAVENOUS; SUBCUTANEOUS at 13:37

## 2023-01-01 RX ADMIN — Medication 15 MCG/MIN: at 20:50

## 2023-01-01 RX ADMIN — INSULIN LISPRO 2 UNITS: 100 INJECTION, SOLUTION INTRAVENOUS; SUBCUTANEOUS at 00:26

## 2023-01-01 RX ADMIN — LORAZEPAM 1 MG: 2 INJECTION INTRAMUSCULAR; INTRAVENOUS at 19:39

## 2023-01-01 RX ADMIN — ALBUMIN (HUMAN) 25 G: 0.25 INJECTION, SOLUTION INTRAVENOUS at 10:31

## 2023-01-01 RX ADMIN — OCTREOTIDE ACETATE 100 MCG: 100 INJECTION, SOLUTION INTRAVENOUS; SUBCUTANEOUS at 05:55

## 2023-01-01 RX ADMIN — Medication 28 MCG/MIN: at 05:52

## 2023-01-01 RX ADMIN — Medication 15 MCG/MIN: at 18:13

## 2023-01-01 RX ADMIN — ALBUMIN (HUMAN) 25 G: 0.25 INJECTION, SOLUTION INTRAVENOUS at 23:00

## 2023-01-01 RX ADMIN — OCTREOTIDE ACETATE 100 MCG: 100 INJECTION, SOLUTION INTRAVENOUS; SUBCUTANEOUS at 23:00

## 2023-01-01 RX ADMIN — AMIODARONE HYDROCHLORIDE 1 MG/MIN: 50 INJECTION, SOLUTION INTRAVENOUS at 13:07

## 2023-01-01 RX ADMIN — SODIUM CHLORIDE, SODIUM LACTATE, POTASSIUM CHLORIDE, AND CALCIUM CHLORIDE 2000 ML: .6; .31; .03; .02 INJECTION, SOLUTION INTRAVENOUS at 13:37

## 2023-01-01 RX ADMIN — SODIUM BICARBONATE 70 ML/HR: 84 INJECTION, SOLUTION INTRAVENOUS at 12:21

## 2023-01-01 RX ADMIN — VANCOMYCIN HYDROCHLORIDE 2000 MG: 1 INJECTION, POWDER, LYOPHILIZED, FOR SOLUTION INTRAVENOUS at 09:20

## 2023-01-01 RX ADMIN — INSULIN LISPRO 2 UNITS: 100 INJECTION, SOLUTION INTRAVENOUS; SUBCUTANEOUS at 17:59

## 2023-01-01 RX ADMIN — INSULIN LISPRO 2 UNITS: 100 INJECTION, SOLUTION INTRAVENOUS; SUBCUTANEOUS at 06:33

## 2023-01-01 RX ADMIN — VASOPRESSIN 0.04 UNITS/MIN: 20 INJECTION INTRAVENOUS at 11:26

## 2023-01-01 RX ADMIN — HEPARIN SODIUM 5000 UNITS: 5000 INJECTION INTRAVENOUS; SUBCUTANEOUS at 06:25

## 2023-01-01 RX ADMIN — Medication 29 MCG/MIN: at 17:59

## 2023-01-01 RX ADMIN — CALCIUM GLUCONATE 1 G: 20 INJECTION, SOLUTION INTRAVENOUS at 06:24

## 2023-01-01 RX ADMIN — PIPERACILLIN AND TAZOBACTAM 3.38 G: 36; 4.5 INJECTION, POWDER, FOR SOLUTION INTRAVENOUS at 05:55

## 2023-01-01 RX ADMIN — ALBUMIN (HUMAN) 25 G: 0.25 INJECTION, SOLUTION INTRAVENOUS at 16:16

## 2023-01-01 RX ADMIN — INSULIN LISPRO 2 UNITS: 100 INJECTION, SOLUTION INTRAVENOUS; SUBCUTANEOUS at 12:20

## 2023-01-01 RX ADMIN — OCTREOTIDE ACETATE 100 MCG: 100 INJECTION, SOLUTION INTRAVENOUS; SUBCUTANEOUS at 13:14

## 2023-01-01 RX ADMIN — EPOPROSTENOL 50 NG/KG/MIN: 1.5 INJECTION, POWDER, LYOPHILIZED, FOR SOLUTION INTRAVENOUS at 08:02

## 2023-01-01 RX ADMIN — ALBUMIN (HUMAN) 25 G: 0.25 INJECTION, SOLUTION INTRAVENOUS at 13:35

## 2023-01-01 RX ADMIN — Medication 20000 ML: at 08:33

## 2023-01-01 RX ADMIN — CALCIUM GLUCONATE 1 G: 20 INJECTION, SOLUTION INTRAVENOUS at 12:20

## 2023-01-01 RX ADMIN — OCTREOTIDE ACETATE 100 MCG: 100 INJECTION, SOLUTION INTRAVENOUS; SUBCUTANEOUS at 00:00

## 2023-01-01 RX ADMIN — FLUTICASONE PROPIONATE 1 SPRAY: 50 SPRAY, METERED NASAL at 10:35

## 2023-01-01 RX ADMIN — AMIODARONE HYDROCHLORIDE 150 MG: 50 INJECTION, SOLUTION INTRAVENOUS at 12:02

## 2023-01-01 RX ADMIN — MAGNESIUM SULFATE HEPTAHYDRATE 1 G: 1 INJECTION, SOLUTION INTRAVENOUS at 12:15

## 2023-01-01 RX ADMIN — Medication 29 MCG/MIN: at 15:58

## 2023-01-01 RX ADMIN — Medication 20000 ML: at 10:21

## 2023-01-01 RX ADMIN — Medication 24 MCG/MIN: at 03:00

## 2023-01-01 RX ADMIN — GLYCERIN 2 DROP: .002; .002; .01 SOLUTION/ DROPS OPHTHALMIC at 08:00

## 2023-01-01 RX ADMIN — CALCIUM GLUCONATE 1 G: 20 INJECTION, SOLUTION INTRAVENOUS at 11:46

## 2023-01-01 RX ADMIN — HEPARIN SODIUM 5000 UNITS: 5000 INJECTION INTRAVENOUS; SUBCUTANEOUS at 05:45

## 2023-01-01 RX ADMIN — Medication 20000 ML: at 20:20

## 2023-01-01 RX ADMIN — CALCIUM GLUCONATE 1 G: 20 INJECTION, SOLUTION INTRAVENOUS at 02:14

## 2023-01-01 RX ADMIN — HEPARIN SODIUM 5000 UNITS: 5000 INJECTION INTRAVENOUS; SUBCUTANEOUS at 21:11

## 2023-01-01 RX ADMIN — HEPARIN SODIUM 5000 UNITS: 5000 INJECTION INTRAVENOUS; SUBCUTANEOUS at 13:38

## 2023-01-01 RX ADMIN — VASOPRESSIN 0.04 UNITS/MIN: 20 INJECTION INTRAVENOUS at 02:30

## 2023-01-01 RX ADMIN — HYDROCORTISONE SODIUM SUCCINATE 50 MG: 100 INJECTION, POWDER, FOR SOLUTION INTRAMUSCULAR; INTRAVENOUS at 17:40

## 2023-01-01 RX ADMIN — AMIODARONE HYDROCHLORIDE 150 MG: 50 INJECTION, SOLUTION INTRAVENOUS at 12:05

## 2023-01-01 RX ADMIN — ALBUMIN (HUMAN) 25 G: 0.25 INJECTION, SOLUTION INTRAVENOUS at 08:46

## 2023-01-01 RX ADMIN — AZITHROMYCIN MONOHYDRATE 500 MG: 500 INJECTION, POWDER, LYOPHILIZED, FOR SOLUTION INTRAVENOUS at 14:39

## 2023-01-01 RX ADMIN — OCTREOTIDE ACETATE 100 MCG: 100 INJECTION, SOLUTION INTRAVENOUS; SUBCUTANEOUS at 09:29

## 2023-01-01 RX ADMIN — PANTOPRAZOLE SODIUM 40 MG: 40 INJECTION, POWDER, FOR SOLUTION INTRAVENOUS at 09:59

## 2023-01-01 RX ADMIN — Medication 20000 ML: at 22:33

## 2023-01-01 RX ADMIN — EPINEPHRINE: 1 INJECTION, SOLUTION, CONCENTRATE INTRAVENOUS at 07:49

## 2023-01-01 RX ADMIN — CALCIUM GLUCONATE 1 G: 20 INJECTION, SOLUTION INTRAVENOUS at 17:06

## 2023-01-01 RX ADMIN — GLYCOPYRROLATE 0.2 MG: 0.2 INJECTION, SOLUTION INTRAMUSCULAR; INTRAVENOUS at 19:39

## 2023-01-01 RX ADMIN — OCTREOTIDE ACETATE 100 MCG: 100 INJECTION, SOLUTION INTRAVENOUS; SUBCUTANEOUS at 13:37

## 2023-01-01 RX ADMIN — MAGNESIUM SULFATE HEPTAHYDRATE 1 G: 1 INJECTION, SOLUTION INTRAVENOUS at 11:54

## 2023-01-01 RX ADMIN — SODIUM BICARBONATE 50 MEQ: 84 INJECTION, SOLUTION INTRAVENOUS at 22:16

## 2023-01-01 RX ADMIN — LACTULOSE 20 G: 20 SOLUTION ORAL at 18:51

## 2023-01-01 RX ADMIN — PIPERACILLIN AND TAZOBACTAM 3.38 G: 36; 4.5 INJECTION, POWDER, FOR SOLUTION INTRAVENOUS at 13:46

## 2023-01-01 RX ADMIN — Medication 28 MCG/MIN: at 08:23

## 2023-01-01 RX ADMIN — PIPERACILLIN AND TAZOBACTAM 3.38 G: 36; 4.5 INJECTION, POWDER, FOR SOLUTION INTRAVENOUS at 01:00

## 2023-01-01 RX ADMIN — POTASSIUM CHLORIDE 20 MEQ: 14.9 INJECTION, SOLUTION INTRAVENOUS at 13:51

## 2023-01-01 RX ADMIN — PIPERACILLIN AND TAZOBACTAM 3.38 G: 36; 4.5 INJECTION, POWDER, FOR SOLUTION INTRAVENOUS at 17:31

## 2023-01-01 RX ADMIN — PIPERACILLIN AND TAZOBACTAM 3.38 G: 36; 4.5 INJECTION, POWDER, FOR SOLUTION INTRAVENOUS at 05:00

## 2023-01-01 RX ADMIN — CALCIUM GLUCONATE 1 G: 20 INJECTION, SOLUTION INTRAVENOUS at 00:25

## 2023-01-01 RX ADMIN — HEPARIN SODIUM 5000 UNITS: 5000 INJECTION INTRAVENOUS; SUBCUTANEOUS at 23:00

## 2023-01-01 RX ADMIN — EPOPROSTENOL 50 NG/KG/MIN: 1.5 INJECTION, POWDER, LYOPHILIZED, FOR SOLUTION INTRAVENOUS at 11:41

## 2023-01-01 RX ADMIN — EPINEPHRINE 2 MCG/MIN: 1 INJECTION, SOLUTION, CONCENTRATE INTRAVENOUS at 10:20

## 2023-01-01 RX ADMIN — LACTULOSE 200 G: 10 SOLUTION ORAL at 12:41

## 2023-01-01 RX ADMIN — Medication 2 MCG/MIN: at 22:52

## 2023-01-01 RX ADMIN — Medication 30 MCG/MIN: at 11:25

## 2023-01-01 RX ADMIN — PIPERACILLIN AND TAZOBACTAM 3.38 G: 36; 4.5 INJECTION, POWDER, FOR SOLUTION INTRAVENOUS at 14:16

## 2023-01-01 RX ADMIN — MAGNESIUM SULFATE HEPTAHYDRATE 1 G: 500 INJECTION, SOLUTION INTRAMUSCULAR; INTRAVENOUS at 12:07

## 2023-01-01 RX ADMIN — GLYCERIN 2 DROP: .002; .002; .01 SOLUTION/ DROPS OPHTHALMIC at 22:00

## 2023-01-01 RX ADMIN — OCTREOTIDE ACETATE 100 MCG: 100 INJECTION, SOLUTION INTRAVENOUS; SUBCUTANEOUS at 14:58

## 2023-01-01 RX ADMIN — INSULIN LISPRO 2 UNITS: 100 INJECTION, SOLUTION INTRAVENOUS; SUBCUTANEOUS at 01:00

## 2023-01-01 RX ADMIN — GLYCERIN 2 DROP: .002; .002; .01 SOLUTION/ DROPS OPHTHALMIC at 15:55

## 2023-01-01 RX ADMIN — SODIUM BICARBONATE 70 ML/HR: 84 INJECTION, SOLUTION INTRAVENOUS at 04:18

## 2023-01-01 RX ADMIN — ONDANSETRON 4 MG: 2 INJECTION INTRAMUSCULAR; INTRAVENOUS at 13:37

## 2023-01-01 RX ADMIN — CHLORHEXIDINE GLUCONATE 15 ML: 1.2 RINSE ORAL at 08:23

## 2023-01-01 RX ADMIN — ALBUMIN (HUMAN) 25 G: 0.25 INJECTION, SOLUTION INTRAVENOUS at 05:52

## 2023-01-01 RX ADMIN — POTASSIUM CHLORIDE 20 MEQ: 14.9 INJECTION, SOLUTION INTRAVENOUS at 11:56

## 2023-01-01 RX ADMIN — INSULIN LISPRO 2 UNITS: 100 INJECTION, SOLUTION INTRAVENOUS; SUBCUTANEOUS at 18:12

## 2023-01-11 ENCOUNTER — APPOINTMENT (EMERGENCY)
Dept: CT IMAGING | Facility: HOSPITAL | Age: 73
End: 2023-01-11

## 2023-01-11 ENCOUNTER — HOSPITAL ENCOUNTER (EMERGENCY)
Facility: HOSPITAL | Age: 73
Discharge: HOME | End: 2023-01-11
Attending: EMERGENCY MEDICINE

## 2023-01-11 VITALS
DIASTOLIC BLOOD PRESSURE: 80 MMHG | SYSTOLIC BLOOD PRESSURE: 195 MMHG | OXYGEN SATURATION: 100 % | HEART RATE: 75 BPM | TEMPERATURE: 98.2 F | WEIGHT: 272.05 LBS | RESPIRATION RATE: 20 BRPM
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
## 2023-01-11 DIAGNOSIS — S09.90XA INJURY OF HEAD, INITIAL ENCOUNTER: ICD-10-CM

## 2023-01-11 DIAGNOSIS — S01.01XA LACERATION OF SCALP, INITIAL ENCOUNTER: ICD-10-CM

## 2023-01-11 LAB
ABO GROUP BLD: NORMAL
ALBUMIN SERPL BCP-MCNC: 2.9 G/DL (ref 3.5–5)
ALP SERPL-CCNC: 162 U/L (ref 46–116)
ALT SERPL W P-5'-P-CCNC: 42 U/L (ref 12–78)
ANION GAP SERPL CALCULATED.3IONS-SCNC: 10 MMOL/L (ref 4–13)
APTT PPP: 29 SECONDS (ref 23–37)
AST SERPL W P-5'-P-CCNC: 60 U/L (ref 5–45)
BASOPHILS # BLD AUTO: 0.04 THOUSANDS/ÂΜL (ref 0–0.1)
BASOPHILS NFR BLD AUTO: 1 % (ref 0–1)
BILIRUB SERPL-MCNC: 1.83 MG/DL (ref 0.2–1)
BLD GP AB SCN SERPL QL: NEGATIVE
BUN SERPL-MCNC: 7 MG/DL (ref 5–25)
CALCIUM ALBUM COR SERPL-MCNC: 9.6 MG/DL (ref 8.3–10.1)
CALCIUM SERPL-MCNC: 8.7 MG/DL (ref 8.3–10.1)
CHLORIDE SERPL-SCNC: 103 MMOL/L (ref 96–108)
CO2 SERPL-SCNC: 26 MMOL/L (ref 21–32)
CREAT SERPL-MCNC: 0.82 MG/DL (ref 0.6–1.3)
EOSINOPHIL # BLD AUTO: 0.24 THOUSAND/ÂΜL (ref 0–0.61)
EOSINOPHIL NFR BLD AUTO: 4 % (ref 0–6)
ERYTHROCYTE [DISTWIDTH] IN BLOOD BY AUTOMATED COUNT: 15.2 % (ref 11.6–15.1)
GFR SERPL CREATININE-BSD FRML MDRD: 72 ML/MIN/1.73SQ M
GLUCOSE SERPL-MCNC: 152 MG/DL (ref 65–140)
HCT VFR BLD AUTO: 41.6 % (ref 34.8–46.1)
HGB BLD-MCNC: 13.5 G/DL (ref 11.5–15.4)
IMM GRANULOCYTES # BLD AUTO: 0.04 THOUSAND/UL (ref 0–0.2)
IMM GRANULOCYTES NFR BLD AUTO: 1 % (ref 0–2)
INR PPP: 1.36 (ref 0.84–1.19)
LYMPHOCYTES # BLD AUTO: 0.88 THOUSANDS/ÂΜL (ref 0.6–4.47)
LYMPHOCYTES NFR BLD AUTO: 16 % (ref 14–44)
MCH RBC QN AUTO: 29 PG (ref 26.8–34.3)
MCHC RBC AUTO-ENTMCNC: 32.5 G/DL (ref 31.4–37.4)
MCV RBC AUTO: 89 FL (ref 82–98)
MONOCYTES # BLD AUTO: 0.34 THOUSAND/ÂΜL (ref 0.17–1.22)
MONOCYTES NFR BLD AUTO: 6 % (ref 4–12)
NEUTROPHILS # BLD AUTO: 3.98 THOUSANDS/ÂΜL (ref 1.85–7.62)
NEUTS SEG NFR BLD AUTO: 72 % (ref 43–75)
NRBC BLD AUTO-RTO: 0 /100 WBCS
PLATELET # BLD AUTO: 52 THOUSANDS/UL (ref 149–390)
PMV BLD AUTO: 10.4 FL (ref 8.9–12.7)
POTASSIUM SERPL-SCNC: 3.7 MMOL/L (ref 3.5–5.3)
PROT SERPL-MCNC: 6.5 G/DL (ref 6.4–8.4)
PROTHROMBIN TIME: 16.9 SECONDS (ref 11.6–14.5)
RBC # BLD AUTO: 4.66 MILLION/UL (ref 3.81–5.12)
RH BLD: NEGATIVE
SODIUM SERPL-SCNC: 139 MMOL/L (ref 135–147)
SPECIMEN EXPIRATION DATE: NORMAL
WBC # BLD AUTO: 5.52 THOUSAND/UL (ref 4.31–10.16)

## 2023-01-11 RX ORDER — LIDOCAINE HYDROCHLORIDE AND EPINEPHRINE 10; 10 MG/ML; UG/ML
20 INJECTION, SOLUTION INFILTRATION; PERINEURAL ONCE
Status: COMPLETED | OUTPATIENT
Start: 2023-01-11 | End: 2023-01-11

## 2023-01-11 RX ADMIN — LIDOCAINE HYDROCHLORIDE,EPINEPHRINE BITARTRATE 20 ML: 10; .01 INJECTION, SOLUTION INFILTRATION; PERINEURAL at 07:40

## 2023-01-11 RX ADMIN — TETANUS TOXOID, REDUCED DIPHTHERIA TOXOID AND ACELLULAR PERTUSSIS VACCINE, ADSORBED 0.5 ML: 5; 2.5; 8; 8; 2.5 SUSPENSION INTRAMUSCULAR at 07:51

## 2023-01-11 NOTE — ED PROVIDER NOTES
Emergency Department Trauma Note  Eren Harrison 71 y o  female MRN: [id number]  Unit/Bed#: ED 09/ED 09 Encounter: [id number]      Trauma Alert: Trauma Acuity: Trauma Evaluation  Model of Arrival: Mode of Arrival: BLS via Trauma Squad Name and Number: [address] [facility]  Trauma Team: Current Providers  Attending Provider: Dashawn Cevallos DO  Registered Nurse: Shaun Villasenor RN  Registered Nurse: Germán Jerome RN  Consultants:     None      History of Present Illness     Chief Complaint:   Chief Complaint   Patient presents with   • Fall     Pt slipped on carpet, fell forward striking head on wood floor, pt on aspirin, denies LOC     HPI:  Eren Harrison is a 71 y o  female who presents with fall, head injury  Mechanism:Details of Incident: slip on throw rug, fell foward striking head Injury Date: 01/11/23 Injury Time: 0630 Injury Occurence Location - [address] [place] [address] Way: [facility]    78-year-old female past medical history of thrombocytopenia, leukopenia, hypertension, diabetes on aspirin presents via EMS describes trip fall just prior to arrival impacting head on dresser incurring laceration  Unsure recent tetanus vaccination  No loss of consciousness  No neck pain  She denies any other injury  Family present at home and called EMS  She describes no prodrome  History provided by:  Patient and EMS personnel  Fall  Mechanism of injury: fall    Injury location:  Head/neck  Incident location:  Home  Fall:     Fall occurred:  Walking    Height of fall:  Walking    Point of impact:  Head    Entrapped after fall: no    Protective equipment: none    Suspicion of alcohol use: no    Suspicion of drug use: no    Tetanus status:  Out of date  Associated symptoms: headaches    Associated symptoms: no abdominal pain, no back pain, no chest pain, no loss of consciousness, no nausea, no neck pain and no vomiting      Review of Systems   Cardiovascular: Negative for chest pain     Gastrointestinal: Negative for abdominal pain, nausea and vomiting  Musculoskeletal: Negative for back pain and neck pain  Neurological: Positive for headaches  Negative for loss of consciousness  All other systems reviewed and are negative  Historical Information     Immunizations:   Immunization History   Administered Date(s) Administered   • Tdap 01/11/2023       Past Medical History:   Diagnosis Date   • Diabetes mellitus ([facility] [address] )      History reviewed  No pertinent family history  History reviewed  No pertinent surgical history  Social History     Tobacco Use   • Smoking status: Never   • Smokeless tobacco: Never   Vaping Use   • Vaping Use: Never used   Substance Use Topics   • Alcohol use: Not Currently   • Drug use: Not Currently     E-Cigarette/Vaping   • E-Cigarette Use Never User      E-Cigarette/Vaping Substances       Family History: non-contributory    Meds/Allergies   None       No Known Allergies    PHYSICAL EXAM    PE limited by: nothing    Objective   Vitals:   First set: Temperature: 98 2 °F (36 8 °C) (01/11/23 0725)  Pulse: 82 (01/11/23 0725)  Respirations: 20 (01/11/23 0725)  Blood Pressure: (!) 216/89 (01/11/23 0725)  SpO2: 96 % (01/11/23 0725)    Primary Survey:   (A) Airway: patent  (B) Breathing: normally  (C) Circulation: Pulses:   normal  (D) Disabliity:  GCS Total:  15  (E) Expose:  Completed    Secondary Survey: (Click on Physical Exam tab above)  Physical Exam  Vitals and nursing note reviewed  Constitutional:       Comments: Pleasant, comfortable-appearing   HENT:      Head: Normocephalic and atraumatic  Mouth/Throat:      Mouth: Mucous membranes are moist       Pharynx: Oropharynx is clear  Eyes:      Conjunctiva/sclera: Conjunctivae normal       Pupils: Pupils are equal, round, and reactive to light  Cardiovascular:      Rate and Rhythm: Normal rate and regular rhythm  Heart sounds: Normal heart sounds  Pulmonary:      Effort: Pulmonary effort is normal       Breath sounds: Normal breath sounds  Abdominal:      General: Bowel sounds are normal  There is no distension  Palpations: Abdomen is soft  Tenderness: There is no abdominal tenderness  Musculoskeletal:         General: No deformity  Cervical back: Neck supple  Right lower leg: Edema present  Left lower leg: Edema present  Skin:     General: Skin is warm and dry  Comments: 4 cm oblique right frontal apical laceration deep to fat without foreign body is hemostatic   Neurological:      General: No focal deficit present  Mental Status: She is alert and oriented to person, place, and time  Cranial Nerves: No cranial nerve deficit  Coordination: Coordination normal    Psychiatric:         Behavior: Behavior normal          Thought Content: Thought content normal          Judgment: Judgment normal              Cervical spine cleared by clinical criteria?  Yes     Invasive Devices     Peripheral Intravenous Line  Duration           Peripheral IV 01/11/23 Right;Ventral (anterior) Forearm <1 day                Lab Results:   Results Reviewed     Procedure Component Value Units Date/Time    APTT [[id number]]  (Normal) Collected: 01/11/23 0728    Lab Status: Final result Specimen: Blood from Arm, Right Updated: 01/11/23 0803     PTT 29 seconds     Protime-INR [[id number]]  (Abnormal) Collected: 01/11/23 0728    Lab Status: Final result Specimen: Blood from Arm, Right Updated: 01/11/23 0803     Protime 16 9 seconds      INR 1 36    CBC and differential [[id number]]  (Abnormal) Collected: 01/11/23 0728    Lab Status: Final result Specimen: Blood from Arm, Right Updated: 01/11/23 0756     WBC 5 52 Thousand/uL      RBC 4 66 Million/uL      Hemoglobin 13 5 g/dL      Hematocrit 41 6 %      MCV 89 fL      MCH 29 0 pg      MCHC 32 5 g/dL      RDW 15 2 %      MPV 10 4 fL      Platelets 52 Thousands/uL      nRBC 0 /100 WBCs      Neutrophils Relative 72 %      Immat GRANS % 1 %      Lymphocytes Relative 16 %      Monocytes Relative 6 %      Eosinophils Relative 4 %      Basophils Relative 1 %      Neutrophils Absolute 3 98 Thousands/µL      Immature Grans Absolute 0 04 Thousand/uL      Lymphocytes Absolute 0 88 Thousands/µL      Monocytes Absolute 0 34 Thousand/µL      Eosinophils Absolute 0 24 Thousand/µL      Basophils Absolute 0 04 Thousands/µL     Comprehensive metabolic panel [[id number]]  (Abnormal) Collected: 01/11/23 0728    Lab Status: Final result Specimen: Blood from Arm, Right Updated: 01/11/23 8624     Sodium 139 mmol/L      Potassium 3 7 mmol/L      Chloride 103 mmol/L      CO2 26 mmol/L      ANION GAP 10 mmol/L      BUN 7 mg/dL      Creatinine 0 82 mg/dL      Glucose 152 mg/dL      Calcium 8 7 mg/dL      Corrected Calcium 9 6 mg/dL      AST 60 U/L      ALT 42 U/L      Alkaline Phosphatase 162 U/L      Total Protein 6 5 g/dL      Albumin 2 9 g/dL      Total Bilirubin 1 83 mg/dL      eGFR 72 ml/min/1 73sq m     Narrative:      [place] guidelines for Chronic Kidney Disease (CKD):   •  Stage 1 with normal or high GFR (GFR > 90 mL/min/1 73 square meters)  •  Stage 2 Mild CKD (GFR = 60-89 mL/min/1 73 square meters)  •  Stage 3A Moderate CKD (GFR = 45-59 mL/min/1 73 square meters)  •  Stage 3B Moderate CKD (GFR = 30-44 mL/min/1 73 square meters)  •  Stage 4 Severe CKD (GFR = 15-29 mL/min/1 73 square meters)  •  Stage 5 End Stage CKD (GFR <15 mL/min/1 73 square meters)  Note: GFR calculation is accurate only with a steady state creatinine                 Imaging Studies:   Direct to CT: Yes  TRAUMA - CT head wo contrast   Final Result by Anni Torres MD (01/11 0969)      No acute intracranial abnormality  Right frontal scalp laceration  The study was marked in [facility] for immediate notification  Workstation performed: [id number]         TRAUMA - CT spine cervical wo contrast   Final Result by Anni Torres MD (01/11 3056)      No cervical spine fracture or traumatic malalignment  The study was marked in [facility] for immediate notification  Workstation performed: [id number]               Procedures  Laceration repair    Date/Time: 1/11/2023 7:53 AM  Performed by: Huyen Chung DO  Authorized by: Huyen Chung DO   Anesthesia: local infiltration    Anesthesia:  Local Anesthetic: lidocaine 1% with epinephrine  Anesthetic total: 12 mL      Procedure Details:  Irrigation solution: saline  Irrigation method: syringe  Amount of cleaning: standard  Skin closure: staples  Patient tolerance: patient tolerated the procedure well with no immediate complications  Comments: Remains hemostatic  Foreign body: Clots and hair removed from wound  ED Course  ED Course as of 01/11/23 0848   Wed Jan 11, 2023   0810 Platelet Count(!): 52   0810 Glucose, Random(!): 152   0810 AST(!): 60   0810 Alkaline Phosphatase(!): 162   0810 TOTAL BILIRUBIN(!): 1 83   0811 PROTIME(!): 16 9   0811 POCT INR(!): 1 36   0844  Notes feeling well, son bedside and supportive  Stable wound remains hemostatic, no swelling  We discussed results and agree with close outpatient follow-up and will return immediately if worse or new symptoms           Medical Decision Making  81-year-old female tripped and fell in her home just prior to arrival impacting head, lacerating  Patient was a trauma alert with good results on CT of head and neck  Her scalp wound was cleaned and repaired, remained stable without swelling or bleeding  Patient agreeable with close outpatient follow-up with her physician and will return immediately if worse or new symptoms  Son present and supportive    Fall, initial encounter: acute illness or injury  Injury of head, initial encounter: acute illness or injury  Laceration of scalp, initial encounter: acute illness or injury  Amount and/or Complexity of Data Reviewed  Labs: ordered  Decision-making details documented in ED Course    Radiology: ordered  Disposition  [facility] Transfer: No  Final diagnoses:   Fall, initial encounter   Injury of head, initial encounter   Laceration of scalp, initial encounter     Time reflects when diagnosis was documented in both MDM as applicable and the Disposition within this note     Time User Action Codes Description Comment    1/11/2023  8:45 AM Harshad Moscoso Add [I71  Lonita Bhatia Fall, initial encounter     1/11/2023  8:46 AM Harshad Moscoso Add [S09 90XA] Injury of head, initial encounter     1/11/2023  8:46 AM Erin Perkins Add [S01 01XA] Laceration of scalp, initial encounter       ED Disposition     ED Disposition   Discharge    Condition   Stable    Date/Time   Wed Jan 11, 2023  8:46 AM    Comment   Jose Luis Dunbar discharge to home/self care  Follow-up Information     Follow up With Specialties Details Why Contact Info    Brigitte Basurto DO  Schedule an appointment as soon as possible for a visit in 1 week or return for staple removal [id number]  [phone]          Patient's Medications    No medications on file     No discharge procedures on file      PDMP Review     None          ED Provider  Electronically Signed by         Krystle Fong DO  01/11/23 0140

## 2023-09-12 PROBLEM — J45.909 ASTHMA: Status: ACTIVE | Noted: 2023-01-01

## 2023-09-12 PROBLEM — K74.60 CIRRHOSIS OF LIVER WITH ASCITES: Status: ACTIVE | Noted: 2023-01-01

## 2023-09-12 PROBLEM — N17.9 AKI (ACUTE KIDNEY INJURY) (HCC): Status: ACTIVE | Noted: 2023-01-01

## 2023-09-12 PROBLEM — E66.01 CLASS 3 SEVERE OBESITY IN ADULT (HCC): Status: ACTIVE | Noted: 2023-01-01

## 2023-09-12 PROBLEM — K72.90 DECOMPENSATED HEPATIC CIRRHOSIS (HCC): Status: ACTIVE | Noted: 2023-01-01

## 2023-09-12 PROBLEM — E11.9 TYPE 2 DIABETES MELLITUS, WITHOUT LONG-TERM CURRENT USE OF INSULIN (HCC): Status: ACTIVE | Noted: 2023-01-01

## 2023-09-12 PROBLEM — I10 HYPERTENSION: Status: ACTIVE | Noted: 2023-01-01

## 2023-09-12 PROBLEM — F41.9 ANXIETY: Status: ACTIVE | Noted: 2023-01-01

## 2023-09-12 PROBLEM — G93.41 ACUTE METABOLIC ENCEPHALOPATHY: Status: ACTIVE | Noted: 2023-01-01

## 2023-09-12 PROBLEM — R18.8 CIRRHOSIS OF LIVER WITH ASCITES: Status: ACTIVE | Noted: 2023-01-01

## 2023-09-12 NOTE — ED PROVIDER NOTES
History  Chief Complaint   Patient presents with   • Abnormal Lab     Patient presents to the ED from [facility] with elevated BUN and creat. Per the [facility] the patient was supposed to receive IV fluids yesterday, however it was not initiated. [facility] physician requested the patient be sent to ED for fluids. Patient's had decreased appetite for the past 1 week. No fevers or chills. Has nausea. Complains of feeling weak and tired. Had outpatient blood work the other day and noted to have an elevated BUN and creatinine. Per EMS, doctor ordered IV fluids but none were given. Sent here for further evaluation. Patient denies any shortness of breath. No dysuria or frequency. Slight cough. History provided by:  Patient   used: No    Fatigue  Severity:  Mild  Onset quality:  Gradual  Duration:  1 week  Timing:  Constant  Progression:  Worsening  Chronicity:  New  Context: dehydration    Context: not change in medication    Relieved by:  Nothing  Worsened by:  Nothing  Ineffective treatments:  None tried  Associated symptoms: cough, lethargy, myalgias and nausea    Associated symptoms: no abdominal pain, no chest pain, no diarrhea, no dizziness, no dysuria, no numbness in extremities, no fever, no foul-smelling urine, no frequency, no headaches, no melena, no seizures, no shortness of breath, no urgency and no vomiting        Prior to Admission Medications   Prescriptions Last Dose Informant Patient Reported? Taking?    albuterol (PROVENTIL HFA,VENTOLIN HFA) 90 mcg/act inhaler   Yes Yes   Sig: Inhale 2 puffs every 6 (six) hours as needed for wheezing   aspirin 81 mg chewable tablet   Yes Yes   Sig: Chew 81 mg daily   atenolol (TENORMIN) 50 mg tablet   Yes Yes   Sig: Take 50 mg by mouth daily   ferrous sulfate 325 (65 Fe) mg tablet   Yes Yes   Sig: Take 325 mg by mouth daily with breakfast   lisinopril (ZESTRIL) 20 mg tablet   Yes Yes   Sig: Take 20 mg by mouth daily   loratadine (CLARITIN) 10 mg tablet   Yes Yes   Sig: Take 10 mg by mouth daily   metFORMIN (GLUCOPHAGE) 1000 MG tablet   Yes Yes   Sig: Take 1,000 mg by mouth 2 (two) times a day with meals   omeprazole (PriLOSEC) 20 mg delayed release capsule   Yes Yes   Sig: Take 20 mg by mouth daily      Facility-Administered Medications: None       Past Medical History:   Diagnosis Date   • Diabetes mellitus ([address])        History reviewed. No pertinent surgical history. History reviewed. No pertinent family history. I have reviewed and agree with the history as documented. E-Cigarette/Vaping   • E-Cigarette Use Never User      E-Cigarette/Vaping Substances     Social History     Tobacco Use   • Smoking status: Never   • Smokeless tobacco: Never   Vaping Use   • Vaping Use: Never used   Substance Use Topics   • Alcohol use: Not Currently   • Drug use: Not Currently       Review of Systems   Constitutional: Positive for activity change and fatigue. Negative for chills and fever. HENT: Negative for ear pain, hearing loss, sore throat, trouble swallowing and voice change. Eyes: Negative for pain and discharge. Respiratory: Positive for cough. Negative for shortness of breath and wheezing. Cardiovascular: Negative for chest pain and palpitations. Gastrointestinal: Positive for nausea. Negative for abdominal pain, blood in stool, constipation, diarrhea, melena and vomiting. Genitourinary: Negative for dysuria, flank pain, frequency, hematuria and urgency. Musculoskeletal: Positive for myalgias. Negative for joint swelling, neck pain and neck stiffness. Skin: Negative for rash and wound. Neurological: Positive for weakness. Negative for dizziness, seizures, syncope, facial asymmetry and headaches. Psychiatric/Behavioral: Negative for hallucinations, self-injury and suicidal ideas. All other systems reviewed and are negative. Physical Exam  Physical Exam  Vitals and nursing note reviewed.    Constitutional:       General: She is not in acute distress. Appearance: She is well-developed. HENT:      Head: Normocephalic and atraumatic. Right Ear: External ear normal.      Left Ear: External ear normal.      Mouth/Throat:      Mouth: Mucous membranes are dry. Eyes:      General: No scleral icterus. Right eye: No discharge. Left eye: No discharge. Extraocular Movements: Extraocular movements intact. Conjunctiva/sclera: Conjunctivae normal.   Cardiovascular:      Rate and Rhythm: Normal rate and regular rhythm. Heart sounds: Normal heart sounds. No murmur heard. Pulmonary:      Effort: Pulmonary effort is normal.      Breath sounds: Normal breath sounds. No wheezing or rales. Abdominal:      General: Bowel sounds are normal. There is no distension. Palpations: Abdomen is soft. Tenderness: There is no abdominal tenderness. There is no guarding or rebound. Musculoskeletal:         General: No deformity. Normal range of motion. Cervical back: Normal range of motion and neck supple. Comments: Bilateral legs and nontender. There are chronic skin changes noted. Equal warmth bilaterally. No drainage or discharge. Skin:     General: Skin is warm and dry. Findings: No rash. Neurological:      General: No focal deficit present. Mental Status: She is alert and oriented to person, place, and time. Cranial Nerves: No cranial nerve deficit. Psychiatric:         Mood and Affect: Mood normal.         Behavior: Behavior normal.         Thought Content:  Thought content normal.         Judgment: Judgment normal.         Vital Signs  ED Triage Vitals [09/12/23 1249]   Temperature Pulse Respirations Blood Pressure SpO2   98.4 °F (36.9 °C) 64 16 155/52 94 %      Temp Source Heart Rate Source Patient Position - Orthostatic VS BP Location FiO2 (%)   Oral Monitor Lying Right arm --      Pain Score       No Pain           Vitals:    09/12/23 1249   BP: 155/52   Pulse: 64   Patient Position - Orthostatic VS: Lying         Visual Acuity      ED Medications  Medications   lactated ringers bolus 2,000 mL (2,000 mL Intravenous New Bag 9/12/23 1337)   azithromycin (ZITHROMAX) 500 mg in sodium chloride 0.9 % 250 mL IVPB (500 mg Intravenous New Bag 9/12/23 1439)   ondansetron (ZOFRAN) injection 4 mg (4 mg Intravenous Given 9/12/23 1337)   cefTRIAXone (ROCEPHIN) IVPB (premix in dextrose) 1,000 mg 50 mL (0 mg Intravenous Stopped 9/12/23 1439)       Diagnostic Studies  Results Reviewed     Procedure Component Value Units Date/Time    FLU/RSV/COVID - if FLU/RSV clinically relevant [[id number]]  (Normal) Collected: 09/12/23 1320    Lab Status: Final result Specimen: Nares from Nasopharyngeal Swab Updated: 09/12/23 1408     SARS-CoV-2 Negative     INFLUENZA A PCR Negative     INFLUENZA B PCR Negative     RSV PCR Negative    Narrative:      FOR PEDIATRIC PATIENTS - copy/paste COVID Guidelines URL to browser: [email or web address]    SARS-CoV-2 assay is a Nucleic Acid Amplification assay intended for the  qualitative detection of nucleic acid from SARS-CoV-2 in nasopharyngeal  swabs. Results are for the presumptive identification of SARS-CoV-2 RNA. Positive results are indicative of infection with SARS-CoV-2, the virus  causing COVID-19, but do not rule out bacterial infection or co-infection  with other viruses. Laboratories within the [facility] and its  territories are required to report all positive results to the appropriate  public health authorities. Negative results do not preclude SARS-CoV-2  infection and should not be used as the sole basis for treatment or other  patient management decisions. Negative results must be combined with  clinical observations, patient history, and epidemiological information. This test has not been FDA cleared or approved. This test has been authorized by FDA under an Emergency Use Authorization  (EUA).  This test is only authorized for the duration of time the  declaration that circumstances exist justifying the authorization of the  emergency use of an in vitro diagnostic tests for detection of SARS-CoV-2  virus and/or diagnosis of COVID-19 infection under section 564(b)(1) of  the Act, 21 U. S.C. 296UQY-4(B)(4), unless the authorization is terminated  or revoked sooner. The test has been validated but independent review by FDA  and CLIA is pending. Test performed using [employer] Gene[employer]pert: This RT-PCR assay targets N2,  a region unique to SARS-CoV-2. A conserved region in the E-gene was chosen  for pan-Sarbecovirus detection which includes SARS-CoV-2. According to CMS-2020-01-R, this platform meets the definition of high-throughput technology. Lactic acid, plasma (w/reflex if result > 2.0) [[id number]]  (Abnormal) Collected: 09/12/23 1320    Lab Status: Final result Specimen: Blood from Arm, Right Updated: 09/12/23 1358     LACTIC ACID 2.2 mmol/L     Narrative:      Result may be elevated if tourniquet was used during collection.     Lactic acid 2 Hours [[id number]]     Lab Status: No result Specimen: Blood     HS Troponin I 2hr [[id number]]     Lab Status: No result Specimen: Blood     HS Troponin 0hr (reflex protocol) [[id number]]  (Normal) Collected: 09/12/23 1320    Lab Status: Final result Specimen: Blood from Arm, Right Updated: 09/12/23 1355     hs TnI 0hr 14 ng/L     Comprehensive metabolic panel [[id number]]  (Abnormal) Collected: 09/12/23 1320    Lab Status: Final result Specimen: Blood from Arm, Right Updated: 09/12/23 1354     Sodium 138 mmol/L      Potassium 5.8 mmol/L      Chloride 110 mmol/L      CO2 14 mmol/L      ANION GAP 14 mmol/L      BUN 75 mg/dL      Creatinine 2.05 mg/dL      Glucose 83 mg/dL      Calcium 8.8 mg/dL      Corrected Calcium 9.7 mg/dL       U/L      ALT 53 U/L      Alkaline Phosphatase 104 U/L      Total Protein 6.3 g/dL      Albumin 2.9 g/dL      Total Bilirubin 2.06 mg/dL eGFR 23 ml/min/1.73sq m     Narrative:      [facility] guidelines for Chronic Kidney Disease (CKD):   •  Stage 1 with normal or high GFR (GFR > 90 mL/min/1.73 square meters)  •  Stage 2 Mild CKD (GFR = 60-89 mL/min/1.73 square meters)  •  Stage 3A Moderate CKD (GFR = 45-59 mL/min/1.73 square meters)  •  Stage 3B Moderate CKD (GFR = 30-44 mL/min/1.73 square meters)  •  Stage 4 Severe CKD (GFR = 15-29 mL/min/1.73 square meters)  •  Stage 5 End Stage CKD (GFR <15 mL/min/1.73 square meters)  Note: GFR calculation is accurate only with a steady state creatinine    Magnesium [[id number]]  (Normal) Collected: 09/12/23 1320    Lab Status: Final result Specimen: Blood from Arm, Right Updated: 09/12/23 1354     Magnesium 2.3 mg/dL     Lipase [[id number]]  (Abnormal) Collected: 09/12/23 1320    Lab Status: Final result Specimen: Blood from Arm, Right Updated: 09/12/23 1354     Lipase 92 u/L     Protime-INR [[id number]]  (Abnormal) Collected: 09/12/23 1320    Lab Status: Final result Specimen: Blood from Arm, Right Updated: 09/12/23 1349     Protime 18.5 seconds      INR 1.51    APTT [[id number]]  (Normal) Collected: 09/12/23 1320    Lab Status: Final result Specimen: Blood from Arm, Right Updated: 09/12/23 1349     PTT 23 seconds     CBC and differential [[id number]] Updated: 09/12/23 1348    Lab Status: No result Specimen: Blood from Arm, Right     Blood culture #1 [[id number]] Collected: 09/12/23 1320    Lab Status: In process Specimen: Blood from Hand, Right Updated: 09/12/23 1327    Blood culture #2 [[id number]] Collected: 09/12/23 1320    Lab Status:  In process Specimen: Blood from Arm, Right Updated: 09/12/23 1327    UA w Reflex to Microscopic w Reflex to Culture [[id number]]     Lab Status: No result Specimen: Urine                  CT abdomen pelvis wo contrast   Final Result by Philippe Kennedy MD (09/12 1411)      Extensive bilateral patchy airspace consolidation in the mid and lower lung zones as detailed above. Correlate with clinical findings for disseminated infectious etiology versus edema/ARDS. Large volume ascites. Findings of cirrhosis and portal hypertension as described. Bilateral flank edema. The study was marked in [facility] for immediate notification. Workstation performed: [id number]         XR chest 1 view portable   ED Interpretation by Chichi Rider MD (09/12 1358)   Possible infiltrate right upper lobe. by Ashley Chavarria MD (09/12 1500)                 Procedures  ECG 12 Lead Documentation Only    Date/Time: 9/12/2023 12:53 PM    Performed by: Chichi Rider MD  Authorized by: Chichi Rider MD    ECG reviewed by me, the ED Provider: yes    Patient location:  ED  Rate:     ECG rate:  60  Rhythm:     Rhythm: sinus rhythm    Ectopy:     Ectopy: none    QRS:     QRS axis:  Normal  Comments:      Right bundle branch block noted. ED Course  ED Course as of 09/12/23 1501   Tue Sep 12, 2023   1357 Potassium(!): 5.8  Moderately hemolyzed   1404 IV noted in the left lower quadrant. Came in from nursing home attached to IV fluids. Possibly try to give IV fluid be fluids in the subcutaneous tissue. 1413 CAT scan results noted. Patient has no respiratory complaints. Doubt the patient has ARDS. SBIRT 22yo+    Flowsheet Row Most Recent Value   Initial Alcohol Screen: US AUDIT-C     1. How often do you have a drink containing alcohol? 0 Filed at: 09/12/2023 1246   2. How many drinks containing alcohol do you have on a typical day you are drinking? 0 Filed at: 09/12/2023 1246   3a. Male UNDER 65: How often do you have five or more drinks on one occasion? 0 Filed at: 09/12/2023 1246   3b. FEMALE Any Age, or MALE 65+: How often do you have 4 or more drinks on one occassion? 0 Filed at: 09/12/2023 1246   Audit-C Score 0 Filed at: 09/12/2023 1246   MIK: How many times in the past year have you. ..     Used an illegal drug or used a prescription medication for non-medical reasons? Never Filed at: 09/12/2023 1246                    Medical Decision Making  Amount and/or Complexity of Data Reviewed  Labs: ordered. Decision-making details documented in ED Course. Radiology: ordered. Decision-making details documented in ED Course. ECG/medicine tests: ordered and independent interpretation performed. Decision-making details documented in ED Course. Discussion of management or test interpretation with external provider(s): Differential diagnosis includes but not limited to dehydration, KANDACE, hyponatremia, hypernatremia, hypokalemia, UTI, viral infection. Risk  Prescription drug management. Disposition  Final diagnoses:   KANDACE (acute kidney injury) ([address])   Dehydration   Ascites     Time reflects when diagnosis was documented in both MDM as applicable and the Disposition within this note     Time User Action Codes Description Comment    9/12/2023  1:58 PM Derrick Weber Add [N17.9] KANDACE (acute kidney injury) ([address])     9/12/2023  2:09 PM Skip Madura P Add [E86.0] Dehydration     9/12/2023  2:13 PM Melvi Weber Add [R18.8] Ascites       ED Disposition     ED Disposition   Admit    Condition   Stable    Date/Time   Tue Sep 12, 2023  2:32 PM    Comment   Case was discussed with Dr. Glena Lefort and the patient's admission status was agreed to be  to the service of Dr. Glena Lefort. Follow-up Information    None         Patient's Medications   Discharge Prescriptions    No medications on file       No discharge procedures on file.     PDMP Review     None          ED Provider  Electronically Signed by           Randolph Duran MD  09/12/23 111 [facility] Darin Casey MD  09/12/23 4924

## 2023-09-12 NOTE — ASSESSMENT & PLAN NOTE
Known liver cirrhosis due to HERNANDEZ, previously without ascites  CT showing large volume ascites, significant portal hypertension, and she clinically appears volume overloaded  Suspect her KANDACE may be hepatorenal  GI consulted  Cover for SBP given presenting leukocytosis with Ceftriaxone  IR for paracentesis  Albumin and Diuresis as above  Check Echocardiogram

## 2023-09-12 NOTE — H&P
[facility]  H&P  Name: Hong Pablo 68 y.o. female I MRN: [id number]  Unit/Bed#: -01 I Date of Admission: 9/12/2023   Date of Service: 9/12/2023 I Hospital Day: 0      Assessment/Plan   * KANDACE (acute kidney injury) ([address] )  Assessment & Plan  Baseline 0.82  Cr on admission 2.05  Initially suspected pre-renal in ED, given 2L IVF, worsened tachypnea after given IVF  Clinically she is volume overloaded, CT consistent with b/l pulm edema, significant portal hypertension and ascites  Likely she is intravascularly dry, consider hepatorenal  Albumin 25g TID with Lasix 80mg IV BID  Trend Cr, I/O, daily weights    Cirrhosis of liver with ascites ([address] )  Assessment & Plan  Known liver cirrhosis due to HERNANDEZ, previously without ascites  CT showing large volume ascites, significant portal hypertension, and she clinically appears volume overloaded  Suspect her KANDACE may be hepatorenal  GI consulted  Cover for SBP given presenting leukocytosis with Ceftriaxone  IR for paracentesis  Albumin and Diuresis as above  Check Echocardiogram    Metabolic encephalopathy  Assessment & Plan  Patient slightly confused, patient's son reports she has been intermittently confused since her recent admission to [facility] and since she has been in rehab.   Unclear etiology, per son not necessarily much worse than most recent baseline, but worse than she was prior to last admission  Monitor  Check ammonia, concern possible HE  Treat underlying illness    Hypertension  Assessment & Plan  Hold Lisinopril  Hold Atenolol    Asthma  Assessment & Plan  Unclear diagnosis, but on albuterol, singulair as outpatient, continue here    Type 2 diabetes mellitus, without long-term current use of insulin [facility])  Assessment & Plan  Lab Results   Component Value Date    HGBA1C 6.4 (H) 02/20/2021       Recent Labs     09/12/23  1727   POCGLU 92       Blood Sugar Average: Last 72 hrs:  (P) 92   Hold metformin  ISS    Anxiety  Assessment & Plan  Continue zoloft    Class 3 severe obesity in adult [facility])  Assessment & Plan  Education on weight loss           VTE Pharmacologic Prophylaxis: VTE Score: 5 High Risk (Score >/= 5) - Pharmacological DVT Prophylaxis Ordered: heparin. Sequential Compression Devices Ordered. Code Status: Level 1 - Full Code   Discussion with family: Updated  (son) via phone. Anticipated Length of Stay: Patient will be admitted on an inpatient basis with an anticipated length of stay of greater than 2 midnights secondary to KANDACE, Decompensated cirrhosis. Total Time Spent on Date of Encounter in care of patient: 65 mins. This time was spent on one or more of the following: performing physical exam; counseling and coordination of care; obtaining or reviewing history; documenting in the medical record; reviewing/ordering tests, medications or procedures; communicating with other healthcare professionals and discussing with patient's family/caregivers. Chief Complaint: Weakness, SoB    History of Present Illness:  Maria Ines Escalera is a 68 y.o. female with a PMH of liver cirrhosis due to HERNANDEZ, anxiety, diabetes, hypertension who presents from nursing home they are unable to give her IV fluids after finding worsening kidney function on outpatient labs. Patient is a limited historian, stage she reports feeling short of breath and believe that is why she was sent here. After speaking with her son, he reports they are having issues with getting access for IV fluids, but also generally she has had poor improvement at rehab, generalized weakness. Ultimately in the emergency department she had a CT scan which showed bilateral diffuse patchy areas consistent with pneumonia versus edema. , as well as large volume ascites, no evidence of significant portal hypertension. Patient admitted for further management. Review of Systems:  Review of Systems   Constitutional: Positive for appetite change and fatigue.  Negative for chills and fever. HENT: Negative for ear pain and sore throat. Eyes: Negative for pain and visual disturbance. Respiratory: Positive for cough and shortness of breath. Cardiovascular: Negative for chest pain and palpitations. Gastrointestinal: Negative for abdominal pain and vomiting. Genitourinary: Negative for dysuria and hematuria. Musculoskeletal: Negative for arthralgias and back pain. Skin: Negative for color change and rash. Neurological: Negative for seizures and syncope. Psychiatric/Behavioral: Positive for confusion. All other systems reviewed and are negative. Past Medical and Surgical History:   Past Medical History:   Diagnosis Date   • Diabetes mellitus ([address])        History reviewed. No pertinent surgical history. Meds/Allergies:  Prior to Admission medications    Medication Sig Start Date End Date Taking?  Authorizing Provider   albuterol (PROVENTIL HFA,VENTOLIN HFA) 90 mcg/act inhaler Inhale 2 puffs every 4 (four) hours as needed for wheezing or shortness of breath   Yes Historical Provider, MD   aspirin 81 mg chewable tablet Chew 81 mg daily   Yes Historical Provider, MD   atenolol (TENORMIN) 50 mg tablet Take 50 mg by mouth daily   Yes Historical Provider, MD   calcium carbonate (OS-DAMI) 600 MG tablet Take 600 mg by mouth daily   Yes Historical Provider, MD   cholecalciferol (VITAMIN D3) 400 units tablet Take 200 Units by mouth daily   Yes Historical Provider, MD   ferrous sulfate 325 (65 Fe) mg tablet Take 325 mg by mouth daily with breakfast   Yes Historical Provider, MD   fluticasone (FLONASE) 50 mcg/act nasal spray 1 spray into each nostril daily   Yes Historical Provider, MD   lisinopril (ZESTRIL) 20 mg tablet Take 20 mg by mouth daily   Yes Historical Provider, MD   loratadine (CLARITIN) 10 mg tablet Take 10 mg by mouth daily   Yes Historical Provider, MD   metFORMIN (GLUCOPHAGE) 1000 MG tablet Take 1,000 mg by mouth 2 (two) times a day with meals   Yes Historical Provider, MD   montelukast (SINGULAIR) 10 mg tablet Take 1 tablet by mouth daily 8/30/23  Yes Historical Provider, MD   omeprazole (PriLOSEC) 20 mg delayed release capsule Take 20 mg by mouth daily   Yes Historical Provider, MD   saccharomyces boulardii (FLORASTOR) 250 mg capsule Take 250 mg by mouth daily   Yes Historical Provider, MD   lisinopril (ZESTRIL) 20 mg tablet Take 20 mg by mouth daily 8/15/23 9/12/23 Yes Historical Provider, MD   acetaminophen (TYLENOL) 325 mg tablet Take 650 mg by mouth every 4 (four) hours as needed for mild pain or fever    Historical Provider, MD   bisacodyl (Dulcolax) 10 mg suppository Insert 10 mg into the rectum if needed for constipation    Historical Provider, MD   ondansetron (ZOFRAN) 4 mg tablet Take 4 mg by mouth every 8 (eight) hours as needed for nausea or vomiting    Historical Provider, MD   sertraline (ZOLOFT) 100 mg tablet Take 200 mg by mouth daily    Historical Provider, MD   aspirin (ECOTRIN LOW STRENGTH) 81 mg EC tablet Take 81 mg by mouth daily  9/12/23  Historical Provider, MD   ferrous sulfate 325 (65 Fe) mg tablet Take 325 mg by mouth daily  9/12/23  Historical Provider, MD     I have reveiwed home medications using records provided by . Allergies: No Known Allergies    Social History:  Marital Status: /Civil Union   Occupation: N/A  Patient Pre-hospital Living Situation: Home - came in from [address] [place] [address]  Patient Pre-hospital Level of Mobility: significant assistance with ambulation  Patient Pre-hospital Diet Restrictions: diabetic  Substance Use History:   Social History     Substance and Sexual Activity   Alcohol Use Not Currently     Social History     Tobacco Use   Smoking Status Never   Smokeless Tobacco Never     Social History     Substance and Sexual Activity   Drug Use Not Currently       Family History:  History reviewed. No pertinent family history.     Physical Exam:     Vitals:   Blood Pressure: 129/99 (09/12/23 1719)  Pulse: 58 (09/12/23 1719)  Temperature: 97.5 °F (36.4 °C) (09/12/23 1719)  Temp Source: Temporal (09/12/23 1719)  Respirations: (!) 36 (09/12/23 1719)  Height: 5' 7" (170.2 cm) (09/12/23 1719)  Weight - Scale: 117 kg (257 lb 8 oz) (09/12/23 1719)  SpO2: 93 % (09/12/23 1719)    Physical Exam  Vitals and nursing note reviewed. Constitutional:       General: She is not in acute distress. Appearance: She is well-developed. HENT:      Head: Normocephalic and atraumatic. Eyes:      Conjunctiva/sclera: Conjunctivae normal.   Cardiovascular:      Rate and Rhythm: Normal rate and regular rhythm. Heart sounds: No murmur heard. Comments: 1+ pitting edema of the lower extremities bilaterally  Pulmonary:      Effort: Pulmonary effort is normal.      Comments: Tachypneic, bibasilar crackles to the mid lung posteriorly  Abdominal:      General: There is no distension. Palpations: Abdomen is soft. Tenderness: There is no abdominal tenderness. Musculoskeletal:         General: No swelling. Cervical back: Neck supple. Skin:     General: Skin is warm and dry. Capillary Refill: Capillary refill takes less than 2 seconds. Comments: Scattered ecchymosis of the upper extremity bilaterally   Neurological:      Mental Status: She is alert.    Psychiatric:         Mood and Affect: Mood normal.          Additional Data:     Lab Results:  Results from last 7 days   Lab Units 09/12/23  1520   WBC Thousand/uL 13.51*   HEMOGLOBIN g/dL 11.8   HEMATOCRIT % 37.0   PLATELETS Thousands/uL 71*   NEUTROS PCT % 83*   LYMPHS PCT % 9*   MONOS PCT % 6   EOS PCT % 1     Results from last 7 days   Lab Units 09/12/23  1320   SODIUM mmol/L 138   POTASSIUM mmol/L 5.8*   CHLORIDE mmol/L 110*   CO2 mmol/L 14*   BUN mg/dL 75*   CREATININE mg/dL 2.05*   ANION GAP mmol/L 14   CALCIUM mg/dL 8.8   ALBUMIN g/dL 2.9*   TOTAL BILIRUBIN mg/dL 2.06*   ALK PHOS U/L 104   ALT U/L 53*   AST U/L 114*   GLUCOSE RANDOM mg/dL 83     Results from last 7 days   Lab Units 09/12/23  1320   INR  1.51*     Results from last 7 days   Lab Units 09/12/23  1727   POC GLUCOSE mg/dl 92         Results from last 7 days   Lab Units 09/12/23  1520 09/12/23  1320   LACTIC ACID mmol/L 2.8* 2.2*       Lines/Drains:  Invasive Devices     Peripheral Intravenous Line  Duration           Peripheral IV 09/12/23 Proximal;Right;Upper;Ventral (anterior) Arm <1 day    Peripheral IV 09/12/23 Right;Ventral (anterior) Hand <1 day          Drain  Duration           External Urinary Catheter <1 day                    Imaging: Reviewed radiology reports from this admission including: abdominal/pelvic CT  CT abdomen pelvis wo contrast   Final Result by Sydnee Tripp MD (09/12 1411)      Extensive bilateral patchy airspace consolidation in the mid and lower lung zones as detailed above. Correlate with clinical findings for disseminated infectious etiology versus edema/ARDS. Large volume ascites. Findings of cirrhosis and portal hypertension as described. Bilateral flank edema. The study was marked in [facility] for immediate notification. Workstation performed: [id number]         XR chest 1 view portable   ED Interpretation by Lani Rhoades MD (09/12 1355)   Possible infiltrate right upper lobe. Final Result by Jenise Pulido MD (09/12 1502)      Opacities in the left upper lobe and right lower lobe, consistent with asymmetric pulmonary edema or multifocal pneumonia. Workstation performed: [id number]         IR INPATIENT Paracentesis    (Results Pending)           ** Please Note: This note has been constructed using a voice recognition system.  **

## 2023-09-12 NOTE — PLAN OF CARE
Problem: MOBILITY - ADULT  Goal: Maintain or return to baseline ADL function  Description: INTERVENTIONS:  -  Assess patient's ability to carry out ADLs; assess patient's baseline for ADL function and identify physical deficits which impact ability to perform ADLs (bathing, care of mouth/teeth, toileting, grooming, dressing, etc.)  - Assess/evaluate cause of self-care deficits   - Assess range of motion  - Assess patient's mobility; develop plan if impaired  - Assess patient's need for assistive devices and provide as appropriate  - Encourage maximum independence but intervene and supervise when necessary  - Involve family in performance of ADLs  - Assess for home care needs following discharge   - Consider OT consult to assist with ADL evaluation and planning for discharge  - Provide patient education as appropriate  Outcome: Progressing  Goal: Maintains/Returns to pre admission functional level  Description: INTERVENTIONS:  - Perform BMAT or MOVE assessment daily.   - Set and communicate daily mobility goal to care team and patient/family/caregiver. - Collaborate with rehabilitation services on mobility goals if consulted  - Perform Range of Motion 2 times a day. - Reposition patient every 2 hours.   - Dangle patient 2 times a day  - Stand patient 2 times a day  - Ambulate patient 2 times a day  - Out of bed to chair 2 times a day   - Out of bed for meals 2 times a day  - Out of bed for toileting  - Record patient progress and toleration of activity level   Outcome: Progressing     Problem: Prexisting or High Potential for Compromised Skin Integrity  Goal: Skin integrity is maintained or improved  Description: INTERVENTIONS:  - Identify patients at risk for skin breakdown  - Assess and monitor skin integrity  - Assess and monitor nutrition and hydration status  - Monitor labs   - Assess for incontinence   - Turn and reposition patient  - Assist with mobility/ambulation  - Relieve pressure over bony prominences  - Avoid friction and shearing  - Provide appropriate hygiene as needed including keeping skin clean and dry  - Evaluate need for skin moisturizer/barrier cream  - Collaborate with interdisciplinary team   - Patient/family teaching  - Consider wound care consult   Outcome: Progressing     Problem: PAIN - ADULT  Goal: Verbalizes/displays adequate comfort level or baseline comfort level  Description: Interventions:  - Encourage patient to monitor pain and request assistance  - Assess pain using appropriate pain scale  - Administer analgesics based on type and severity of pain and evaluate response  - Implement non-pharmacological measures as appropriate and evaluate response  - Consider cultural and social influences on pain and pain management  - Notify physician/advanced practitioner if interventions unsuccessful or patient reports new pain  Outcome: Progressing     Problem: INFECTION - ADULT  Goal: Absence or prevention of progression during hospitalization  Description: INTERVENTIONS:  - Assess and monitor for signs and symptoms of infection  - Monitor lab/diagnostic results  - Monitor all insertion sites, i.e. indwelling lines, tubes, and drains  - Monitor endotracheal if appropriate and nasal secretions for changes in amount and color  - Junction appropriate cooling/warming therapies per order  - Administer medications as ordered  - Instruct and encourage patient and family to use good hand hygiene technique  - Identify and instruct in appropriate isolation precautions for identified infection/condition  Outcome: Progressing  Goal: Absence of fever/infection during neutropenic period  Description: INTERVENTIONS:  - Monitor WBC    Outcome: Progressing     Problem: SAFETY ADULT  Goal: Maintain or return to baseline ADL function  Description: INTERVENTIONS:  -  Assess patient's ability to carry out ADLs; assess patient's baseline for ADL function and identify physical deficits which impact ability to perform ADLs (bathing, care of mouth/teeth, toileting, grooming, dressing, etc.)  - Assess/evaluate cause of self-care deficits   - Assess range of motion  - Assess patient's mobility; develop plan if impaired  - Assess patient's need for assistive devices and provide as appropriate  - Encourage maximum independence but intervene and supervise when necessary  - Involve family in performance of ADLs  - Assess for home care needs following discharge   - Consider OT consult to assist with ADL evaluation and planning for discharge  - Provide patient education as appropriate  Outcome: Progressing  Goal: Maintains/Returns to pre admission functional level  Description: INTERVENTIONS:  - Perform BMAT or MOVE assessment daily.   - Set and communicate daily mobility goal to care team and patient/family/caregiver. - Collaborate with rehabilitation services on mobility goals if consulted  - Perform Range of Motion 2 times a day. - Reposition patient every 2 hours.   - Dangle patient 2 times a day  - Stand patient 2 times a day  - Ambulate patient 2 times a day  - Out of bed to chair 2 times a day   - Out of bed for meals 2 times a day  - Out of bed for toileting  - Record patient progress and toleration of activity level   Outcome: Progressing  Goal: Patient will remain free of falls  Description: INTERVENTIONS:  - Educate patient/family on patient safety including physical limitations  - Instruct patient to call for assistance with activity   - Consult OT/PT to assist with strengthening/mobility   - Keep Call bell within reach  - Keep bed low and locked with side rails adjusted as appropriate  - Keep care items and personal belongings within reach  - Initiate and maintain comfort rounds  - Make Fall Risk Sign visible to staff  - Offer Toileting every 2 Hours, in advance of need  - Initiate/Maintain bed alarm  - Obtain necessary fall risk management equipment: walker, sit to stand, yellow socks    - Apply yellow socks and bracelet for high fall risk patients  - Consider moving patient to room near nurses station  Outcome: Progressing     Problem: DISCHARGE PLANNING  Goal: Discharge to home or other facility with appropriate resources  Description: INTERVENTIONS:  - Identify barriers to discharge w/patient and caregiver  - Arrange for needed discharge resources and transportation as appropriate  - Identify discharge learning needs (meds, wound care, etc.)  - Arrange for interpretive services to assist at discharge as needed  - Refer to Case Management Department for coordinating discharge planning if the patient needs post-hospital services based on physician/advanced practitioner order or complex needs related to functional status, cognitive ability, or social support system  Outcome: Progressing     Problem: Knowledge Deficit  Goal: Patient/family/caregiver demonstrates understanding of disease process, treatment plan, medications, and discharge instructions  Description: Complete learning assessment and assess knowledge base.   Interventions:  - Provide teaching at level of understanding  - Provide teaching via preferred learning methods  Outcome: Progressing     Problem: RESPIRATORY - ADULT  Goal: Achieves optimal ventilation and oxygenation  Description: INTERVENTIONS:  - Assess for changes in respiratory status  - Assess for changes in mentation and behavior  - Position to facilitate oxygenation and minimize respiratory effort  - Oxygen administered by appropriate delivery if ordered  - Initiate smoking cessation education as indicated  - Encourage broncho-pulmonary hygiene including cough, deep breathe, Incentive Spirometry  - Assess the need for suctioning and aspirate as needed  - Assess and instruct to report SOB or any respiratory difficulty  - Respiratory Therapy support as indicated  Outcome: Progressing     Problem: METABOLIC, FLUID AND ELECTROLYTES - ADULT  Goal: Electrolytes maintained within normal limits  Description: INTERVENTIONS:  - Monitor labs and assess patient for signs and symptoms of electrolyte imbalances  - Administer electrolyte replacement as ordered  - Monitor response to electrolyte replacements, including repeat lab results as appropriate  - Instruct patient on fluid and nutrition as appropriate  Outcome: Progressing  Goal: Fluid balance maintained  Description: INTERVENTIONS:  - Monitor labs   - Monitor I/O and WT  - Instruct patient on fluid and nutrition as appropriate  - Assess for signs & symptoms of volume excess or deficit  Outcome: Progressing  Goal: Glucose maintained within target range  Description: INTERVENTIONS:  - Monitor Blood Glucose as ordered  - Assess for signs and symptoms of hyperglycemia and hypoglycemia  - Administer ordered medications to maintain glucose within target range  - Assess nutritional intake and initiate nutrition service referral as needed  Outcome: Progressing

## 2023-09-12 NOTE — ASSESSMENT & PLAN NOTE
Baseline 0.82  Cr on admission 2.05  Initially suspected pre-renal in ED, given 2L IVF, worsened tachypnea after given IVF  Clinically she is volume overloaded, CT consistent with b/l pulm edema, significant portal hypertension and ascites  Likely she is intravascularly dry, consider hepatorenal  Albumin 25g TID with Lasix 80mg IV BID  Trend Cr, I/O, daily weights

## 2023-09-12 NOTE — ASSESSMENT & PLAN NOTE
Lab Results   Component Value Date    HGBA1C 6.4 (H) 02/20/2021       Recent Labs     09/12/23  1727   POCGLU 92       Blood Sugar Average: Last 72 hrs:  (P) 92   Hold metformin  ISS

## 2023-09-12 NOTE — ASSESSMENT & PLAN NOTE
Patient slightly confused, patient's son reports she has been intermittently confused since her recent admission to [facility] and since she has been in rehab.   Unclear etiology, per son not necessarily much worse than most recent baseline, but worse than she was prior to last admission  Monitor  Check ammonia, concern possible HE  Treat underlying illness

## 2023-09-13 PROBLEM — K65.2 SBP (SPONTANEOUS BACTERIAL PERITONITIS) (HCC): Status: ACTIVE | Noted: 2023-01-01

## 2023-09-13 PROBLEM — J96.01 ACUTE RESPIRATORY FAILURE WITH HYPOXIA (HCC): Status: ACTIVE | Noted: 2023-01-01

## 2023-09-13 PROBLEM — A41.9 SEPSIS (HCC): Status: ACTIVE | Noted: 2023-01-01

## 2023-09-13 NOTE — ASSESSMENT & PLAN NOTE
· Large vL ascites on CT scan   · Worsening WBCs    Plan:   · Trend fever and WBC curve   · ABX broadened   · Pending paracentesis with cultures

## 2023-09-13 NOTE — PROGRESS NOTES
[address] [facility]  Progress Note  Name: Avis Sales  MRN: [id number]  Unit/Bed#: -01 I Date of Admission: 9/12/2023   Date of Service: 9/13/2023 I Hospital Day: 1    Assessment/Plan   * KANDACE (acute kidney injury) ([address] [facility])  Assessment & Plan  Baseline 0.82  Cr on admission 2.05  Initially suspected pre-renal in ED, given 2L IVF, worsened tachypnea after given IVF  Clinically she is volume overloaded, CT consistent with b/l pulm edema, significant portal hypertension and ascites  Suspect Hepatorenal  Albumin 25g TID  Octreotide and Midodrine started overnight  Cr worsened, Anuric with only 30cc UOP overnight after clayton placement  Trend Cr, I/O, daily weights  Nephrology consulted, appreciate recommendations    Acute respiratory failure with hypoxia [facility])  Assessment & Plan  Patient with worsening hypoxia overnight  Suspect fluid overload versus PNA/Aspiration  Now on 4L  Check ABG  Repeat CXR  NPO  Zosyn to cover possible aspiration PNA as well as alternative infectious etiologies    SBP (spontaneous bacterial peritonitis) (HCC)  Assessment & Plan  Worsening Leukocytosis, differential SBP versus Aspiration PNA versus less likely UTI  Broaden Ceftriaxone to Zosyn to cover aspiration as well  IR for paracentesis with labs    NPO for possible aspiration, Speech consulted    Decompensated hepatic cirrhosis ([address] [facility])  Assessment & Plan  Known liver cirrhosis due to HERNANDEZ, previously without ascites  CT showing large volume ascites, significant portal hypertension, and she clinically appears volume overloaded  See KANDACE/Hepatorenal above  GI consulted  Possible SBP, Abx as below  IR for paracentesis  Albumin and Diuresis as above  Check Echocardiogram    Patient clinically declining, multiple co-morbidities, discussed with ICU and accepted for transfer to higher level of care, also discussed and updated patients Son and  with patients decline    Acute metabolic acidosis  Assessment & Plan  Due to renal failure/KANDACE  Bicarb gtt started, low rate to avoid further fluid overload/free fluid  Nephrology following    Metabolic encephalopathy  Assessment & Plan  Patient slightly confused on admission, patient's son reports she has been intermittently confused since her recent admission to [facility] and since she has been in rehab. AMS has worsened since admission  Suspect possible HE  Ammonia 99  Lactulose started, due to aspiration risk has not received  NG tube ordered for lactulose administration  Check CT Head given worsening AMS, to r/o alternative etiology, stroke low suspicion  Check ABG given worsened respiratory condition    Hepatorenal syndrome ([address])  Assessment & Plan  See KANDACE above    Ascites  Assessment & Plan  See above    Hypertension  Assessment & Plan  Hold Lisinopril  Hold Atenolol    Asthma  Assessment & Plan  Unclear diagnosis, but on albuterol, singulair as outpatient, continue here    Type 2 diabetes mellitus, without long-term current use of insulin [facility])  Assessment & Plan  Lab Results   Component Value Date    HGBA1C 6.4 (H) 02/20/2021       Recent Labs     09/12/23  1727 09/12/23  2153 09/13/23  0025   POCGLU 92 99 114       Blood Sugar Average: Last 72 hrs:  (P) [id number]   Hold metformin  ISS    Anxiety  Assessment & Plan  Continue zoloft    Class 3 severe obesity in adult [facility])  Assessment & Plan  Education on weight loss               VTE Pharmacologic Prophylaxis: VTE Score: 5 Moderate Risk (Score 3-4) - Pharmacological DVT Prophylaxis Ordered: heparin. Patient Centered Rounds: I performed bedside rounds with nursing staff today. Discussions with Specialists or Other Care Team Provider: ICU, Nephrology    Education and Discussions with Family / Patient: Updated  ( and son) via phone. Total Time Spent on Date of Encounter in care of patient: 55 mins.  This time was spent on one or more of the following: performing physical exam; counseling and coordination of care; obtaining or reviewing history; documenting in the medical record; reviewing/ordering tests, medications or procedures; communicating with other healthcare professionals and discussing with patient's family/caregivers. Current Length of Stay: 1 day(s)  Current Patient Status: Inpatient   Certification Statement: The patient will continue to require additional inpatient hospital stay due to serious illness  Discharge Plan: Anticipate discharge in >72 hrs to discharge location to be determined pending rehab evaluations. Code Status: Level 1 - Full Code    Subjective:   Patient confused/lethargic    Objective:     Vitals:   Temp (24hrs), Av.6 °F (36.4 °C), Min:97.2 °F (36.2 °C), Max:98.4 °F (36.9 °C)    Temp:  [97.2 °F (36.2 °C)-98.4 °F (36.9 °C)] 97.3 °F (36.3 °C)  HR:  [58-72] 67  Resp:  [16-36] 28  BP: (121-155)/(45-99) 122/48  SpO2:  [90 %-96 %] 90 %  Body mass index is 40.33 kg/m². Input and Output Summary (last 24 hours): Intake/Output Summary (Last 24 hours) at 2023 1015  Last data filed at 2023 0539  Gross per 24 hour   Intake 80594 ml   Output 30 ml   Net 02586 ml       Physical Exam:   Physical Exam  Vitals and nursing note reviewed. Constitutional:       General: She is in acute distress. Appearance: She is well-developed. She is ill-appearing. HENT:      Head: Normocephalic and atraumatic. Eyes:      Conjunctiva/sclera: Conjunctivae normal.   Cardiovascular:      Rate and Rhythm: Normal rate and regular rhythm. Heart sounds: No murmur heard. Comments: 1+ pitting edema of the lower extremities bilaterally  Pulmonary:      Comments: Diffuse coarse breath sounds throughout, worsened exam compared to prior, tachypneic    Abdominal:      Palpations: Abdomen is soft. Tenderness: There is no abdominal tenderness. Musculoskeletal:         General: No swelling. Cervical back: Neck supple. Skin:     General: Skin is warm and dry. Capillary Refill: Capillary refill takes less than 2 seconds. Comments: Scattered ecchymosis of the upper extremity bilaterally   Neurological:      Mental Status: She is alert. Comments: Lethargic, poor mumbled responses, but does follow commands and moving all extremities on command   Psychiatric:         Mood and Affect: Mood normal.          Additional Data:     Labs:  Results from last 7 days   Lab Units 09/13/23  0612 09/12/23  1903 09/12/23  1520   WBC Thousand/uL 16.65*  --  13.51*   HEMOGLOBIN g/dL 12.3  --  11.8   HEMATOCRIT % 40.5  --  37.0   PLATELETS Thousands/uL 63*   < > 71*   NEUTROS PCT %  --   --  83*   LYMPHS PCT %  --   --  9*   MONOS PCT %  --   --  6   EOS PCT %  --   --  1    < > = values in this interval not displayed.      Results from last 7 days   Lab Units 09/13/23  0534   SODIUM mmol/L 139   POTASSIUM mmol/L 5.2   CHLORIDE mmol/L 112*   CO2 mmol/L 12*   BUN mg/dL 78*   CREATININE mg/dL 2.44*   ANION GAP mmol/L 15   CALCIUM mg/dL 8.6   ALBUMIN g/dL 2.9*   TOTAL BILIRUBIN mg/dL 1.98*   ALK PHOS U/L 105*   ALT U/L 44   AST U/L 88*   GLUCOSE RANDOM mg/dL 101     Results from last 7 days   Lab Units 09/13/23  0534   INR  1.60*     Results from last 7 days   Lab Units 09/13/23  0025 09/12/23  2153 09/12/23  1727   POC GLUCOSE mg/dl 114 99 92         Results from last 7 days   Lab Units 09/13/23  0534 09/12/23  1520 09/12/23  1320   LACTIC ACID mmol/L 1.9 2.8* 2.2*       Lines/Drains:  Invasive Devices     Peripheral Intravenous Line  Duration           Peripheral IV 09/12/23 Proximal;Right;Upper;Ventral (anterior) Arm <1 day    Peripheral IV 09/12/23 Right;Ventral (anterior) Hand <1 day          Drain  Duration           Urethral Catheter Latex 18 Fr. <1 day              Urinary Catheter:  Goal for removal: maintain clayton at this time               Imaging: Reviewed radiology reports from this admission including: chest xray    Recent Cultures (last 7 days):   Results from last 7 days Lab Units 09/12/23  1320   BLOOD CULTURE  Received in Microbiology Lab. Culture in Progress. Received in Microbiology Lab. Culture in Progress. Last 24 Hours Medication List:   Current Facility-Administered Medications   Medication Dose Route Frequency Provider Last Rate   • Albumin 25%  25 g Intravenous TID Nikolas Manuel Counts, DO     • albuterol  2 puff Inhalation Q4H PRN Nikolas Manuel Counts, DO     • bisacodyl  10 mg Rectal PRN Nikolas Manuel Counts, DO     • fluticasone  1 spray Nasal Daily Nikolas Manuel Counts, DO     • heparin (porcine)  5,000 Units Subcutaneous Q8H [address] Nikolas Manuel Counts, DO     • insulin lispro  2-12 Units Subcutaneous Q6H HOLLY Kenney     • lactulose  20 g Oral BID Nikolas Manuel Counts, DO     • loratadine  10 mg Oral Daily Nikolas Manuel Counts, DO     • midodrine  5 mg Oral TID AC AurHOLLY White     • montelukast  10 mg Oral Daily Nikolas Manuel Counts, DO     • octreotide  100 mcg Intravenous [facility] HOLLY Kenney     • ondansetron  4 mg Oral Q8H PRN Nikolas Manuel Counts, DO     • pantoprazole  20 mg Oral Early Morning Nikolas Manuel Counts, DO     • piperacillin-tazobactam  3.375 g Intravenous Q6H Nikolas Manuel Counts, DO     • saccharomyces boulardii  250 mg Oral Daily Nikolas Manuel Counts, DO     • sertraline  200 mg Oral Daily Nikolas Manuel Counts, DO     • sodium bicarbonate 150 mEq in dextrose 5 % 1,000 mL infusion  70 mL/hr Intravenous Continuous Josh Mckenzie MD          Today, Patient Was Seen By: Darlene Fabian DO    **Please Note: This note may have been constructed using a voice recognition system. **

## 2023-09-13 NOTE — SEPSIS NOTE
Sepsis Note   Andreas Hunt 68 y.o. female MRN: [id number]  Unit/Bed#: -01 Encounter: [id number]       Initial Sepsis Screening     [address] Name 09/13/23 1030                Is the patient's history suggestive of a new or worsening infection? Yes (Proceed)  -SS        Suspected source of infection pneumonia;suspect infection, source unknown  -SS        Indicate SIRS criteria Tachypnea > 20 resp per min;Leukocytosis (WBC > 35006 IJL) OR Leukopenia (WBC <4000 IJL) OR Bandemia (WBC >10% bands)  -SS        Are two or more of the above signs & symptoms of infection both present and new to the patient? Yes (Proceed)  -SS        Assess for evidence of organ dysfunction: Are any of the below criteria present within 6 hours of suspected infection and SIRS criteria that are NOT considered to be chronic conditions? Creatinine > 2. 0;Creatinine > 2.0 AND > 0.5 above baseline;Platelet count < 766,713;QYVNN output < 0.5 mL/kg/hour for 2 hours; New need for invasive/non-invasive ventilation  -        Date of presentation of severe sepsis 09/13/23  -        Time of presentation of severe sepsis 1030  -        Sepsis Note: Click "NEXT" below (NOT "close") to generate sepsis note based on above information. YES (proceed by clicking "NEXT")  -              User Key  (r) = Recorded By, (t) = Taken By, (c) = Cosigned By    [address] [place] [address] Name Provider Type    SS HOLLY Garces Nurse Practitioner                    Body mass index is 40.33 kg/m². Wt Readings from Last 1 Encounters:   09/12/23 117 kg (257 lb 8 oz)     IBW (Ideal Body Weight): 61.6 kg    Ideal body weight: 61.6 kg (135 lb 12.9 oz)  Adjusted ideal body weight: 83.7 kg (184 lb 7.7 oz)     2L IV fluid resuscitation given in ED.

## 2023-09-13 NOTE — SPEECH THERAPY NOTE
Speech Language/Pathology  Speech-Language Pathology Bedside Swallow Evaluation      Patient Name: Page Carpenter    DZYDS'M Date: 9/13/2023     Summary   Consult received for bedside swallow eval secondary to NPO status. Pt admitted w/ KANDACE, cirrhosis of liver and metabolic encephalopathy. PMHx includes HTN, asthma, DM2, and anxiety. Pt seen upright in bed, RN present. Pt responsive to verbal/tactile stimuli but lethargic and does not open eyes. Pt denies dysphagia at baseline. Currently on 4L via nasal cannula, SPO2 90-92%, RR in the 40s. Oral care completed w/ toothbrush and suction. Intermittent wet/weak cough. PO trials completed w/ ice chip x1- limited oral manipulation and absent pharyngeal swallow. Pt stated she felt too dry to swallow. With nectar thick via spoon, pt demonstrated improved oral manipulation w/ suspected posterior spillage and delayed swallow. Hyolaryngeal elevation appeared decreased upon palpation. Wet cough following 1/3 trials. Pt declined puree trials. Recommend to continue NPO status. Medications non oral. Frequent and thorough oral care. SLP will continue to follow, pt may require VBS before initiating oral diet    Risk/s for Aspiration: High     Recommended Diet: NPO   Recommended Form of Meds: non-oral if possible   Aspiration precautions and swallowing strategies: Frequent oral care        Current Medical Status  Page Carpenter is a 68 y.o. female with a PMH of liver cirrhosis due to HERNANDEZ, anxiety, diabetes, hypertension who presents from nursing home they are unable to give her IV fluids after finding worsening kidney function on outpatient labs. Patient is a limited historian, stage she reports feeling short of breath and believe that is why she was sent here. After speaking with her son, he reports they are having issues with getting access for IV fluids, but also generally she has had poor improvement at rehab, generalized weakness.   Ultimately in the emergency department she had a CT scan which showed bilateral diffuse patchy areas consistent with pneumonia versus edema. , as well as large volume ascites, no evidence of significant portal hypertension. Patient admitted for further management. Current Precautions:  Fall  Aspiration     Allergies:  No known food allergies    Past medical history:  Please see H&P for details    Special Studies:  CT chest:  Extensive bilateral patchy airspace consolidation in the mid and lower lung zones as detailed above. Correlate with clinical findings for disseminated infectious etiology versus edema/ARDS. Large volume ascites. Findings of cirrhosis and portal hypertension as described. Bilateral flank edema. Social/Education/Vocational Hx:  Pt lives in SNF/ECF    Swallow Information   Current Risks for Dysphagia & Aspiration: AMS  Current Symptoms/Concerns: coughing with po and change in respiratory status  Current Diet: NPO   Baseline Diet: regular diet and thin liquids      Baseline Assessment   Behavior/Cognition: lethargic  Speech/Language Status: able to follow commands inconsistently  Patient Positioning: upright in bed  Pain Status/Interventions/Response to Interventions:   No report of or nonverbal indications of pain. Swallow Mechanism Exam  Facial: symmetrical  Labial: unable to test 2/2 limited command following  Lingual: unable to test 2/2 limited command following  Velum: unable to visualize  Mandible: unable to test 2/2 limited command following  Dentition: adequate  Vocal quality:weak   Volitional Cough: weak   Respiratory Status: on 4L      Consistencies Assessed and Performance   Consistencies Administered: ice chips and nectar thick    Oral Stage: moderate  Absent manipulation of ice chip. Improved w/ nectar thick via spoon suspect d/t spoon pressure and liquid viscosity. Suspected posterior spillage    Pharyngeal Stage: suspected  Absent swallow w/ ice chip. Delayed w/ nectar thick via spoon.  Suspected impaired hyolaryngeal elevation/excursion upon palpation. +wet cough on 1/3 trials w/ nectar thick and apart from intake. Esophageal Concerns: none reported    Summary and Recommendations (see above)    Results Reviewed with: patient, RN and MD     Treatment Recommended: Dysphagia therapy     Frequency of treatment: 3-5x/week    Dysphagia LTG  -Patient will demonstrate safe and effective oral intake (without overt s/s significant oral/pharyngeal dysphagia including s/s penetration or aspiration) for the highest appropriate diet level. Short Term Goals:  -Pt will tolerate Dysphagia 1/pureed diet and  nectar thick with no significant s/s oral or pharyngeal dysphagia across 1-3 diagnostic session/s    -Patient will tolerate trials of upgraded food and/or liquid texture with no significant s/s of oral or pharyngeal dysphagia including aspiration across 1-3 diagnostic sessions     -Patient will comply with a Video/Modified Barium Swallow study for more complete assessment of swallowing anatomy/physiology/aspiration risk and to assess efficacy of treatment techniques so as to best guide treatment plan    Re: Mastication  -Patient will demonstrate adequate mastication to safely consume the  least restrictive diet with no verbal, visual or tactile cues needed. Re: Compensatory Strategies    -Patient will demonstrate independent use of recommended safe swallowing strategies during a clinician assessed meal across 1-3 diagnostic sessions.    - Patient’s caregiver will demonstrate adherence to recommended diet, as well as application of aspiration precautions and compensatory strategies.       Speech Therapy Prognosis   Prognosis: fair    Prognosis Considerations: medical status     Ju Kumari, [id number] [facility]  9/13/2023

## 2023-09-13 NOTE — PROGRESS NOTES
[address] [facility]  Progress Note  Name: Juan Vidales  MRN: [id number]  Unit/Bed#: -01 I Date of Admission: 2023   Date of Service: 2023 I Hospital Day: 1    Assessment/Plan   Sepsis ([address])  Assessment & Plan  · Unclear etiololgy   · DDX: PNA vs SBP   · WBC: 13.5 - 16.5  · LA: 1.9   · RR:32//Temp: 97.3     Plan:   · BCx2 Pend   · Paracentesis pending with intent to send cx but, ABX started   · Broadened to Zoysn from Ceftriaxone   · No currently hypotensive or LA >4 - No evidence of shock state. · Trend fever and WBC   · Support respiratory effort as mentioned above. · Procal daily   · UA: negative     Acute respiratory failure with hypoxia (HCC)  Assessment & Plan  · DDX: Volume overload versus pneumonia  · Worsening hypoxia overnight since admission. · Increased work of breathing with abdominal muscle use  · ABG revealed metabolic acidosis  · CT Chest: Extensive bilateral patchy airspace consolidation in the mid and lower lung zones as detailed above. Correlate with clinical findings for disseminated infectious etiology versus edema/ARDS. Large volume ascites. Findings of cirrhosis and portal hypertension as described. Bilateral flank edema. Plan:  · Antibiotics broadened to Zosyn  · Consider additional vancomycin  · Repeat chest x-ray revealed worsening bilateral opacifications unclear with worsening pneumonia or worsening pulmonary edema  · Placed on BiPAP for work of breathing  · Will trial off BiPAP this afternoon  · Wean oxygen as able.      Risk for SBP (spontaneous bacterial peritonitis) (HCC)  Assessment & Plan  · Large vL ascites on CT scan   · Worsening WBCs    Plan:   · Trend fever and WBC curve   · ABX broadened   · Pending paracentesis with cultures     Acute metabolic acidosis  Assessment & Plan  · Worsening gap acidosis  · Secondary to KANDACE   · AB./BE:-18  · CO2: 12     Plan:  · Initiate bicarb drip  · Trend BMPs every 6  · Nephrology following recommendations appreciated  · Concern for need for dialysis initiation    Hepatorenal syndrome ([address])  Assessment & Plan  · Risk for hepatorenal   · See plan as stated above     Ascites  Assessment & Plan  · Secondary to liver cirrhosis d/t HERNANDEZ   · CT Abdomen: Large vL ascites. Extensive bilateral patchy airspace consolidation in the mid and lower lung zones. B/L Flank edema   · Concern for SBP     Plan:   · Discontinue diuresis in the setting of worsening Cr. · Nephrology consulted   · Albumin and Octreotide started. · IR for paracentesis with labs pending     Hypertension  Assessment & Plan  · Holding antihypertensive agents     Asthma  Assessment & Plan  · No PFTs on file  · Nebs PRN   · No wheezing on exam     Type 2 diabetes mellitus, without long-term current use of insulin [facility])  Assessment & Plan  Lab Results   Component Value Date    HGBA1C 6.4 (H) 02/20/2021       Recent Labs     09/12/23  1727 09/12/23  2153 09/13/23  0025 09/13/23  1115   POCGLU 92 99 114 114       Blood Sugar Average: Last 72 hrs:  (P) 104.75     · Continue q 6 accuchecks   · SSI     Anxiety  Assessment & Plan  · Continue Zoloft when able to PO     Metabolic encephalopathy  Assessment & Plan  · Patient was slightly confused on admission however confusion has worsened. · Family states that she has been confused over the last 2 weeks at the nursing home. · Likely secondary to elevated ammonia level and metabolic acidosis in the setting of sepsis. · CT head: Negative for acute intracranial process. Reported prominent ventricles. Imaging seems about similar from previous scan. · Would consider MRI of brain if decreased mentation continues     Plan:   · Administer lactulose rectally  · Trend ammonia level  · Neuro check Q2        Decompensated hepatic cirrhosis (HCC)  Assessment & Plan  · Known liver cirrhosis d/t HERNANDEZ   · CT abdomen with concern for large vL ascites.    · Worsening mentation and respiratory status  · MELD: 19 Plan:   · Consult to GI and Nephrology   · Albumin q 6   · Octreotide IV   · May require dialysis for fluid removal  · Pending paracentesis of large volume ascites     Class 3 severe obesity in adult [facility])  Assessment & Plan  · Consult on lifestyle modifications when appropriate. * KANDACE (acute kidney injury) ([address])  Assessment & Plan  · Baseline: 0.82   · Cr. On admission: 2.05   · Initially given IVF fluids when suspected pre-renal however that worsened respiratory status. · Clinically volume overload   · Worsening Cr. And anuria     Plan:   · Nephrology consulted. Recommendations appreciated   · Family agreeable to dialysis if indicated   · Octreotide and bicarb gtt started   · Concern for hepatorenal syndrome as cause for worsening KANDACE   · Bicarb gtt started   · Trend Cr. · Trend BMP q 6 hours   · Monitor I/O   · Daily Weight   · Will likely required CRRT for volume removal in the setting of anuric renal failure. · Electrolytes stable  · Concern will require dialysis for fluid removal as she is grossly edematous on exam           ICU Core Measures     A: Assess, Prevent, and Manage Pain · Has pain been assessed? Yes  · Need for changes to pain regimen? No   B: Both SAT/SAT  · N/A   C: Choice of Sedation · RASS Goal: 0 Alert and Calm  · Need for changes to sedation or analgesia regimen? No   D: Delirium · CAM-ICU: Unable to perform secondary to Acute cognitive dysfunction   E: Early Mobility  · Plan for early mobility? Yes   F: Family Engagement · Plan for family engagement today? Yes       Antibiotic Review: Awaiting culture results. Review of Invasive Devices:     Iggy Plan: Continue for accurate I/O monitoring for 48 hours        Prophylaxis:  VTE VTE covered by:  heparin (porcine), Subcutaneous, 5,000 Units at 09/13/23 1337       Stress Ulcer  not ordered       Subjective   HPI/24hr events:   78-year-old female with type 2 diabetes, cirrhosis secondary to HERNANDEZ, new onset KANDACE, concern for sepsis and worsening hypoxic respiratory failure overnight secondary to metabolic acidosis. Notable KANDACE worsening with anuric kidney failure. Transferred to ICU with concern for hepatorenal failure that may potentially require dialysis initiation. At this moment electrolytes are stable. Creatinine continues to rise in spite initial IV fluid resuscitation and then diuresis secondary to volume overload. Nephrology consulted and initiated bicarb drip. Family amenable to dialysis should patient needed. It is likely to require CRRT for fluid volume management and kidney recovery. Overnight, patient has had worsening mentation and increased work of breathing for which she required transfer to ICU as a stepdown level 1 patient. Given rectal lactulose and placed on BiPAP for work of breathing with frequent neurochecks. Review of Systems   Unable to perform ROS: Mental status change             Objective                            Vitals I/O      Most Recent Min/Max in 24hrs   Temp 97.8 °F (36.6 °C) Temp  Min: 97.2 °F (36.2 °C)  Max: 97.8 °F (36.6 °C)   Pulse 69 Pulse  Min: 58  Max: 72   Resp (!) 42 Resp  Min: 18  Max: 47   /60 BP  Min: 98/52  Max: 148/70   O2 Sat 95 % SpO2  Min: 90 %  Max: 98 %      Intake/Output Summary (Last 24 hours) at 2023 1442  Last data filed at 2023 1401  Gross per 24 hour   Intake 38152.67 ml   Output 65 ml   Net 37727.67 ml         Diet NPO     Invasive Monitoring Physical exam   None  Physical Exam  Skin:     General: Skin is warm. Capillary Refill: Capillary refill takes less than 2 seconds. Coloration: Skin is pale. HENT:      Head: Normocephalic. Cardiovascular:      Rate and Rhythm: Normal rate and regular rhythm. Pulses: Normal pulses. Musculoskeletal:      Right lower leg: 3+ Edema present. Left lower le+ Edema present. Abdominal:      Palpations: Abdomen is soft. Tenderness: There is no abdominal tenderness.    Constitutional: Appearance: She is ill-appearing. Pulmonary:      Effort: Tachypnea, accessory muscle usage and accessory muscle usage present. Neurological:      Mental Status: She is lethargic. GCS: GCS eye subscore is 2. GCS verbal subscore is 5. GCS motor subscore is 6. Motor: Weakness.       Comments: Generalized weakness   Genitourinary/Anorectal:     Comments: Parkinson to be placed for I/O           Diagnostic Studies      EKG: NSR   Imaging: CT and chest x-ray  I have personally reviewed pertinent films in PACS     Medications:  Scheduled PRN   Albumin 25%, 25 g, TID  chlorhexidine, 15 mL, Q12H [address]  heparin (porcine), 5,000 Units, Q8H [address]  insulin lispro, 2-12 Units, Q6H  lactulose, 200 g, TID  octreotide, 100 mcg, Q8H LANDEN  piperacillin-tazobactam, 3.375 g, Q6H      ondansetron, 4 mg, Q8H PRN       Continuous    sodium bicarbonate 150 mEq in dextrose 5 % 1,000 mL infusion, 70 mL/hr, Last Rate: 70 mL/hr (09/13/23 1221)         Labs:    CBC    Recent Labs     09/12/23  1520 09/12/23  1903 09/13/23  0612   WBC 13.51*  --  16.65*   HGB 11.8  --  12.3   HCT 37.0  --  40.5   PLT 71* 71* 63*     BMP    Recent Labs     09/13/23  0534 09/13/23  1240   SODIUM 139 140   K 5.2 5.3   * 112*   CO2 12* 12*   AGAP 15 16   BUN 78* 83*   CREATININE 2.44* 2.66*   CALCIUM 8.6 8.6       Coags    Recent Labs     09/12/23  1320 09/13/23  0534   INR 1.51* 1.60*   PTT 23  --         Additional Electrolytes  Recent Labs     09/13/23  0534 09/13/23  1240   MG 2.2 2.2   PHOS 5.2* 5.7*          Blood Gas    Recent Labs     09/13/23  1056   PHART 7.233*   QWO8WYN 34.8*   PO2ART 62.3*   MBH8AKA 14.3*   BEART -12.1   SOURCE Radial, Right     Recent Labs     09/13/23  1056   SOURCE Radial, Right    LFTs  Recent Labs     09/12/23  1320 09/13/23  0534   ALT 53* 44   * 88*   ALKPHOS 104 105*   ALB 2.9* 2.9*   TBILI 2.06* 1.98*       Infectious  No recent results  Glucose  Recent Labs     09/12/23  1320 09/13/23  0534 09/13/23  1240 GLUC 83 101 111               Critical Care Time Delivered: Upon my evaluation, this patient had a high probability of imminent or life-threatening deterioration due to Worsening acute hypoxic respiratory failure, KANDACE, sepsis, cirrhosis secondary to HERNANDEZ, concern for hepatorenal syndrome, toxic metabolic encephalopathy and metabolic acidosis, , which required my direct attention, intervention, and personal management. I have personally provided 20 minutes of critical care time, exclusive of procedures, teaching, family meetings, and any prior time recorded by providers other than myself.      HOLLY Harrison

## 2023-09-13 NOTE — PLAN OF CARE
Problem: MOBILITY - ADULT  Goal: Maintain or return to baseline ADL function  Description: INTERVENTIONS:  -  Assess patient's ability to carry out ADLs; assess patient's baseline for ADL function and identify physical deficits which impact ability to perform ADLs (bathing, care of mouth/teeth, toileting, grooming, dressing, etc.)  - Assess/evaluate cause of self-care deficits   - Assess range of motion  - Assess patient's mobility; develop plan if impaired  - Assess patient's need for assistive devices and provide as appropriate  - Encourage maximum independence but intervene and supervise when necessary  - Involve family in performance of ADLs  - Assess for home care needs following discharge   - Consider OT consult to assist with ADL evaluation and planning for discharge  - Provide patient education as appropriate  Outcome: Progressing  Goal: Maintains/Returns to pre admission functional level  Description: INTERVENTIONS:  - Perform BMAT or MOVE assessment daily.   - Set and communicate daily mobility goal to care team and patient/family/caregiver. - Collaborate with rehabilitation services on mobility goals if consulted  - Perform Range of Motion 2 times a day. - Reposition patient every 2 hours.   - Dangle patient 2 times a day  - Stand patient 2 times a day  - Ambulate patient 2 times a day  - Out of bed to chair 2 times a day   - Out of bed for meals 2 times a day  - Out of bed for toileting  - Record patient progress and toleration of activity level   Outcome: Progressing     Problem: Prexisting or High Potential for Compromised Skin Integrity  Goal: Skin integrity is maintained or improved  Description: INTERVENTIONS:  - Identify patients at risk for skin breakdown  - Assess and monitor skin integrity  - Assess and monitor nutrition and hydration status  - Monitor labs   - Assess for incontinence   - Turn and reposition patient  - Assist with mobility/ambulation  - Relieve pressure over bony prominences  - Avoid friction and shearing  - Provide appropriate hygiene as needed including keeping skin clean and dry  - Evaluate need for skin moisturizer/barrier cream  - Collaborate with interdisciplinary team   - Patient/family teaching  - Consider wound care consult   Outcome: Progressing     Problem: PAIN - ADULT  Goal: Verbalizes/displays adequate comfort level or baseline comfort level  Description: Interventions:  - Encourage patient to monitor pain and request assistance  - Assess pain using appropriate pain scale  - Administer analgesics based on type and severity of pain and evaluate response  - Implement non-pharmacological measures as appropriate and evaluate response  - Consider cultural and social influences on pain and pain management  - Notify physician/advanced practitioner if interventions unsuccessful or patient reports new pain  Outcome: Progressing     Problem: INFECTION - ADULT  Goal: Absence or prevention of progression during hospitalization  Description: INTERVENTIONS:  - Assess and monitor for signs and symptoms of infection  - Monitor lab/diagnostic results  - Monitor all insertion sites, i.e. indwelling lines, tubes, and drains  - Monitor endotracheal if appropriate and nasal secretions for changes in amount and color  - Electra appropriate cooling/warming therapies per order  - Administer medications as ordered  - Instruct and encourage patient and family to use good hand hygiene technique  - Identify and instruct in appropriate isolation precautions for identified infection/condition  Outcome: Progressing  Goal: Absence of fever/infection during neutropenic period  Description: INTERVENTIONS:  - Monitor WBC    Outcome: Progressing     Problem: SAFETY ADULT  Goal: Maintain or return to baseline ADL function  Description: INTERVENTIONS:  -  Assess patient's ability to carry out ADLs; assess patient's baseline for ADL function and identify physical deficits which impact ability to perform ADLs (bathing, care of mouth/teeth, toileting, grooming, dressing, etc.)  - Assess/evaluate cause of self-care deficits   - Assess range of motion  - Assess patient's mobility; develop plan if impaired  - Assess patient's need for assistive devices and provide as appropriate  - Encourage maximum independence but intervene and supervise when necessary  - Involve family in performance of ADLs  - Assess for home care needs following discharge   - Consider OT consult to assist with ADL evaluation and planning for discharge  - Provide patient education as appropriate  Outcome: Progressing  Goal: Maintains/Returns to pre admission functional level  Description: INTERVENTIONS:  - Perform BMAT or MOVE assessment daily.   - Set and communicate daily mobility goal to care team and patient/family/caregiver. - Collaborate with rehabilitation services on mobility goals if consulted  - Perform Range of Motion 2 times a day. - Reposition patient every 2 hours.   - Dangle patient 2 times a day  - Stand patient 2 times a day  - Ambulate patient 2 times a day  - Out of bed to chair 2 times a day   - Out of bed for meals 2 times a day  - Out of bed for toileting  - Record patient progress and toleration of activity level   Outcome: Progressing  Goal: Patient will remain free of falls  Description: INTERVENTIONS:  - Educate patient/family on patient safety including physical limitations  - Instruct patient to call for assistance with activity   - Consult OT/PT to assist with strengthening/mobility   - Keep Call bell within reach  - Keep bed low and locked with side rails adjusted as appropriate  - Keep care items and personal belongings within reach  - Initiate and maintain comfort rounds  - Make Fall Risk Sign visible to staff  - Offer Toileting every 2 Hours, in advance of need  - Initiate/Maintain bed alarm  - Obtain necessary fall risk management equipment: walker, sit to stand, yellow socks    - Apply yellow socks and bracelet for high fall risk patients  - Consider moving patient to room near nurses station  Outcome: Progressing     Problem: DISCHARGE PLANNING  Goal: Discharge to home or other facility with appropriate resources  Description: INTERVENTIONS:  - Identify barriers to discharge w/patient and caregiver  - Arrange for needed discharge resources and transportation as appropriate  - Identify discharge learning needs (meds, wound care, etc.)  - Arrange for interpretive services to assist at discharge as needed  - Refer to Case Management Department for coordinating discharge planning if the patient needs post-hospital services based on physician/advanced practitioner order or complex needs related to functional status, cognitive ability, or social support system  Outcome: Progressing     Problem: Knowledge Deficit  Goal: Patient/family/caregiver demonstrates understanding of disease process, treatment plan, medications, and discharge instructions  Description: Complete learning assessment and assess knowledge base.   Interventions:  - Provide teaching at level of understanding  - Provide teaching via preferred learning methods  Outcome: Progressing     Problem: RESPIRATORY - ADULT  Goal: Achieves optimal ventilation and oxygenation  Description: INTERVENTIONS:  - Assess for changes in respiratory status  - Assess for changes in mentation and behavior  - Position to facilitate oxygenation and minimize respiratory effort  - Oxygen administered by appropriate delivery if ordered  - Initiate smoking cessation education as indicated  - Encourage broncho-pulmonary hygiene including cough, deep breathe, Incentive Spirometry  - Assess the need for suctioning and aspirate as needed  - Assess and instruct to report SOB or any respiratory difficulty  - Respiratory Therapy support as indicated  Outcome: Progressing     Problem: METABOLIC, FLUID AND ELECTROLYTES - ADULT  Goal: Electrolytes maintained within normal limits  Description: INTERVENTIONS:  - Monitor labs and assess patient for signs and symptoms of electrolyte imbalances  - Administer electrolyte replacement as ordered  - Monitor response to electrolyte replacements, including repeat lab results as appropriate  - Instruct patient on fluid and nutrition as appropriate  Outcome: Progressing  Goal: Fluid balance maintained  Description: INTERVENTIONS:  - Monitor labs   - Monitor I/O and WT  - Instruct patient on fluid and nutrition as appropriate  - Assess for signs & symptoms of volume excess or deficit  Outcome: Progressing  Goal: Glucose maintained within target range  Description: INTERVENTIONS:  - Monitor Blood Glucose as ordered  - Assess for signs and symptoms of hyperglycemia and hypoglycemia  - Administer ordered medications to maintain glucose within target range  - Assess nutritional intake and initiate nutrition service referral as needed  Outcome: Progressing

## 2023-09-13 NOTE — PROGRESS NOTES
Deterioration Index Critical Care Recommendations  Room #: 332  Deterioration index score: 60.17%    Critical Care recommends Data discrepancy. Pulse ox not 86%. BP cuff on same arm. Spoke with NA from primary team    Brief summary:   Critical care was brought to the patient's bedside via deterioration index alert. The alert was concerning for NA. Please contact critical care via [employer] with any questions or concerns.

## 2023-09-13 NOTE — ASSESSMENT & PLAN NOTE
Worsening Leukocytosis, differential SBP versus Aspiration PNA versus less likely UTI  Broaden Ceftriaxone to Zosyn to cover aspiration as well  IR for paracentesis with labs    NPO for possible aspiration, Speech consulted

## 2023-09-13 NOTE — PLAN OF CARE
Problem: MOBILITY - ADULT  Goal: Maintain or return to baseline ADL function  Description: INTERVENTIONS:  -  Assess patient's ability to carry out ADLs; assess patient's baseline for ADL function and identify physical deficits which impact ability to perform ADLs (bathing, care of mouth/teeth, toileting, grooming, dressing, etc.)  - Assess/evaluate cause of self-care deficits   - Assess range of motion  - Assess patient's mobility; develop plan if impaired  - Assess patient's need for assistive devices and provide as appropriate  - Encourage maximum independence but intervene and supervise when necessary  - Involve family in performance of ADLs  - Assess for home care needs following discharge   - Consider OT consult to assist with ADL evaluation and planning for discharge  - Provide patient education as appropriate  Outcome: Progressing  Goal: Maintains/Returns to pre admission functional level  Description: INTERVENTIONS:  - Perform BMAT or MOVE assessment daily.   - Set and communicate daily mobility goal to care team and patient/family/caregiver. - Collaborate with rehabilitation services on mobility goals if consulted  - Perform Range of Motion 2 times a day. - Reposition patient every 2 hours.   - Dangle patient 2 times a day  - Stand patient 2 times a day  - Ambulate patient 2 times a day  - Out of bed to chair 2 times a day   - Out of bed for meals 2 times a day  - Out of bed for toileting  - Record patient progress and toleration of activity level   Outcome: Progressing     Problem: Prexisting or High Potential for Compromised Skin Integrity  Goal: Skin integrity is maintained or improved  Description: INTERVENTIONS:  - Identify patients at risk for skin breakdown  - Assess and monitor skin integrity  - Assess and monitor nutrition and hydration status  - Monitor labs   - Assess for incontinence   - Turn and reposition patient  - Assist with mobility/ambulation  - Relieve pressure over bony prominences  - Avoid friction and shearing  - Provide appropriate hygiene as needed including keeping skin clean and dry  - Evaluate need for skin moisturizer/barrier cream  - Collaborate with interdisciplinary team   - Patient/family teaching  - Consider wound care consult   Outcome: Progressing     Problem: PAIN - ADULT  Goal: Verbalizes/displays adequate comfort level or baseline comfort level  Description: Interventions:  - Encourage patient to monitor pain and request assistance  - Assess pain using appropriate pain scale  - Administer analgesics based on type and severity of pain and evaluate response  - Implement non-pharmacological measures as appropriate and evaluate response  - Consider cultural and social influences on pain and pain management  - Notify physician/advanced practitioner if interventions unsuccessful or patient reports new pain  Outcome: Progressing     Problem: INFECTION - ADULT  Goal: Absence or prevention of progression during hospitalization  Description: INTERVENTIONS:  - Assess and monitor for signs and symptoms of infection  - Monitor lab/diagnostic results  - Monitor all insertion sites, i.e. indwelling lines, tubes, and drains  - Monitor endotracheal if appropriate and nasal secretions for changes in amount and color  - [place] appropriate cooling/warming therapies per order  - Administer medications as ordered  - Instruct and encourage patient and family to use good hand hygiene technique  - Identify and instruct in appropriate isolation precautions for identified infection/condition  Outcome: Progressing  Goal: Absence of fever/infection during neutropenic period  Description: INTERVENTIONS:  - Monitor WBC    Outcome: Progressing     Problem: SAFETY ADULT  Goal: Maintain or return to baseline ADL function  Description: INTERVENTIONS:  -  Assess patient's ability to carry out ADLs; assess patient's baseline for ADL function and identify physical deficits which impact ability to perform ADLs (bathing, care of mouth/teeth, toileting, grooming, dressing, etc.)  - Assess/evaluate cause of self-care deficits   - Assess range of motion  - Assess patient's mobility; develop plan if impaired  - Assess patient's need for assistive devices and provide as appropriate  - Encourage maximum independence but intervene and supervise when necessary  - Involve family in performance of ADLs  - Assess for home care needs following discharge   - Consider OT consult to assist with ADL evaluation and planning for discharge  - Provide patient education as appropriate  Outcome: Progressing  Goal: Maintains/Returns to pre admission functional level  Description: INTERVENTIONS:  - Perform BMAT or MOVE assessment daily.   - Set and communicate daily mobility goal to care team and patient/family/caregiver. - Collaborate with rehabilitation services on mobility goals if consulted  - Perform Range of Motion 2 times a day. - Reposition patient every 2 hours.   - Dangle patient 2 times a day  - Stand patient 2 times a day  - Ambulate patient 2 times a day  - Out of bed to chair 2 times a day   - Out of bed for meals 2 times a day  - Out of bed for toileting  - Record patient progress and toleration of activity level   Outcome: Progressing  Goal: Patient will remain free of falls  Description: INTERVENTIONS:  - Educate patient/family on patient safety including physical limitations  - Instruct patient to call for assistance with activity   - Consult OT/PT to assist with strengthening/mobility   - Keep Call bell within reach  - Keep bed low and locked with side rails adjusted as appropriate  - Keep care items and personal belongings within reach  - Initiate and maintain comfort rounds  - Make Fall Risk Sign visible to staff  - Offer Toileting every 2 Hours, in advance of need  - Initiate/Maintain bed alarm  - Obtain necessary fall risk management equipment: walker, sit to stand, yellow socks    - Apply yellow socks and bracelet for high fall risk patients  - Consider moving patient to room near nurses station  Outcome: Progressing     Problem: DISCHARGE PLANNING  Goal: Discharge to home or other facility with appropriate resources  Description: INTERVENTIONS:  - Identify barriers to discharge w/patient and caregiver  - Arrange for needed discharge resources and transportation as appropriate  - Identify discharge learning needs (meds, wound care, etc.)  - Arrange for interpretive services to assist at discharge as needed  - Refer to Case Management Department for coordinating discharge planning if the patient needs post-hospital services based on physician/advanced practitioner order or complex needs related to functional status, cognitive ability, or social support system  Outcome: Progressing     Problem: Knowledge Deficit  Goal: Patient/family/caregiver demonstrates understanding of disease process, treatment plan, medications, and discharge instructions  Description: Complete learning assessment and assess knowledge base.   Interventions:  - Provide teaching at level of understanding  - Provide teaching via preferred learning methods  Outcome: Progressing     Problem: RESPIRATORY - ADULT  Goal: Achieves optimal ventilation and oxygenation  Description: INTERVENTIONS:  - Assess for changes in respiratory status  - Assess for changes in mentation and behavior  - Position to facilitate oxygenation and minimize respiratory effort  - Oxygen administered by appropriate delivery if ordered  - Initiate smoking cessation education as indicated  - Encourage broncho-pulmonary hygiene including cough, deep breathe, Incentive Spirometry  - Assess the need for suctioning and aspirate as needed  - Assess and instruct to report SOB or any respiratory difficulty  - Respiratory Therapy support as indicated  Outcome: Progressing     Problem: METABOLIC, FLUID AND ELECTROLYTES - ADULT  Goal: Electrolytes maintained within normal limits  Description: INTERVENTIONS:  - Monitor labs and assess patient for signs and symptoms of electrolyte imbalances  - Administer electrolyte replacement as ordered  - Monitor response to electrolyte replacements, including repeat lab results as appropriate  - Instruct patient on fluid and nutrition as appropriate  Outcome: Progressing  Goal: Fluid balance maintained  Description: INTERVENTIONS:  - Monitor labs   - Monitor I/O and WT  - Instruct patient on fluid and nutrition as appropriate  - Assess for signs & symptoms of volume excess or deficit  Outcome: Progressing  Goal: Glucose maintained within target range  Description: INTERVENTIONS:  - Monitor Blood Glucose as ordered  - Assess for signs and symptoms of hyperglycemia and hypoglycemia  - Administer ordered medications to maintain glucose within target range  - Assess nutritional intake and initiate nutrition service referral as needed  Outcome: Progressing

## 2023-09-13 NOTE — ASSESSMENT & PLAN NOTE
· DDX: Volume overload versus pneumonia  · Worsening hypoxia overnight since admission. · Increased work of breathing with abdominal muscle use  · ABG revealed metabolic acidosis  · CT Chest: Extensive bilateral patchy airspace consolidation in the mid and lower lung zones as detailed above. Correlate with clinical findings for disseminated infectious etiology versus edema/ARDS. Large volume ascites. Findings of cirrhosis and portal hypertension as described. Bilateral flank edema. Plan:  · Antibiotics broadened to Zosyn  · Consider additional vancomycin  · Repeat chest x-ray revealed worsening bilateral opacifications unclear with worsening pneumonia or worsening pulmonary edema  · Placed on BiPAP for work of breathing  · Will trial off BiPAP this afternoon  · Wean oxygen as able.

## 2023-09-13 NOTE — ASSESSMENT & PLAN NOTE
Lab Results   Component Value Date    HGBA1C 6.4 (H) 02/20/2021       Recent Labs     09/12/23  1727 09/12/23  2153 09/13/23  0025   POCGLU 92 99 114       Blood Sugar Average: Last 72 hrs:  (P) [id number]   Hold metformin  ISS

## 2023-09-13 NOTE — ASSESSMENT & PLAN NOTE
Known liver cirrhosis due to HERNANDEZ, previously without ascites  CT showing large volume ascites, significant portal hypertension, and she clinically appears volume overloaded  See KANDACE/Hepatorenal above  GI consulted  Possible SBP, Abx as below  IR for paracentesis  Albumin and Diuresis as above  Check Echocardiogram    Patient clinically declining, multiple co-morbidities, discussed with ICU and accepted for transfer to higher level of care, also discussed and updated patients Son and  with patients decline

## 2023-09-13 NOTE — CASE MANAGEMENT
Case Management Assessment & Discharge Planning Note    Patient name Mare Morgan  Location /-01 MRN [id number]  : 1950 Date 2023       Current Admission Date: 2023  Current Admission Diagnosis:KANDACE (acute kidney injury) [facility])   Patient Active Problem List    Diagnosis Date Noted   • Risk for SBP (spontaneous bacterial peritonitis) ([address]) 2023   • Acute respiratory failure with hypoxia ([address]) 2023   • Sepsis ([address]) 2023   • Hepatorenal syndrome ([address])    • Acute metabolic acidosis    • KANDACE (acute kidney injury) ([address]) 2023   • Class 3 severe obesity in adult [facility]) 2023   • Decompensated hepatic cirrhosis ([address]) 1352   • Metabolic encephalopathy    • Anxiety 2023   • Type 2 diabetes mellitus, without long-term current use of insulin ([address]) 2023   • Asthma 2023   • Hypertension 2023   • Ascites       LOS (days): 1  Geometric Mean LOS (GMLOS) (days): 4.70  Days to GMLOS:3.7     OBJECTIVE:    Risk of Unplanned Readmission Score: 18.59         Current admission status: Inpatient  Referral Reason: Rehab    Preferred Pharmacy:   [address] [place] [address], [address] [address]  Phone: [phone] Fax: [phone]    Primary Care Provider: Alma Rosa Garcia DO    Primary Insurance: MEDICARE  Secondary Insurance: BLUE CROSS    ASSESSMENT:  Active Health Care Proxies    There are no active Health Care Proxies on file.        Advance Directives  Does patient have a [address]?: No  Was patient offered paperwork?: Yes (son is not sure if patient has POA, is going to call family )  Does patient currently have a Health Care decision maker?: Yes, please see Health Care Proxy section  Does patient have Advance Directives?: No  Was patient offered paperwork?: Yes (son declined)  Primary Contact: Constantine Haines, son         Readmission Root Cause  30 Day Readmission: No    Patient Information  Admitted from[de-identified] Facility  Mental Status: Other (Comment)  During Assessment patient was accompanied by: Other-Comment (CM contacted son for CM assessment)  Assessment information provided by[de-identified] Son  Primary Caregiver: Other (Comment)  Caregiver's Name[de-identified] [facility] facility staff  Caregiver's Relationship to Patient[de-identified] Other (Specify)  Support Systems: Spouse/significant other, Son  [place] of Residence: [address] [place] [address] do you live in?: [facility] entry access options. Select all that apply.: Ramp  Type of Current Residence: [place]  In the last 12 months, was there a time when you were not able to pay the mortgage or rent on time?: No  In the last 12 months, how many places have you lived?: 2  In the last 12 months, was there a time when you did not have a steady place to sleep or slept in a shelter (including now)?: No  Homeless/housing insecurity resource given?: N/A  Living Arrangements: Other (Comment) ([place] [facility])  Is patient a ?: No    Activities of Daily Living Prior to Admission  Functional Status: Total dependent  Completes ADLs independently?: No  Level of ADL dependence: Total Dependent  Ambulates independently?: No  Level of ambulatory dependence:  Total Dependent  Does patient use assisted devices?: Yes  Assisted Devices (DME) used: Ermelinda Oakley, Wheelchair  Does patient currently own DME?: Yes  What DME does the patient currently own?: Ermelinda Oakley  Does patient have a history of Outpatient Therapy (PT/OT)?: No  Does the patient have a history of Short-Term Rehab?: Yes ([facility])  Does patient have a history of HHC?: Yes (unknown agency)  Does patient currently have [facility]?: No         Patient Information Continued  Income Source: SSI/SSD  Does patient have prescription coverage?: Yes  Within the past 12 months, you worried that your food would run out before you got the money to buy more.: Never true  Within the past 12 months, the food you bought just didn't last and you didn't have money to get more.: Never true  Food insecurity resource given?: N/A  Does patient receive dialysis treatments?: No  Does patient have a history of substance abuse?: No  Does patient have a history of Mental Health Diagnosis?: Yes (anxiety)  Is patient receiving treatment for mental health?: Yes (medication management)  Has patient received inpatient treatment related to mental health in the last 2 years?: No         Means of Transportation  Means of Transport to Appts[de-identified] Other (Comment) ([address] [facility])  In the past 12 months, has lack of transportation kept you from medical appointments or from getting medications?: No  In the past 12 months, has lack of transportation kept you from meetings, work, or from getting things needed for daily living?: No  Was application for public transport provided?: N/A        DISCHARGE DETAILS:    Discharge planning discussed with[de-identified] sonFederico of Choice: Yes  Comments - Freedom of Choice: AIDIN referral to [facility] contacted family/caregiver?: Yes             Contacts  Patient Contacts: Cliff Soto jasbir  Relationship to Patient[de-identified] Family  Contact Method: Phone  Phone Number: [phone] (H)  Reason/Outcome: Continuity of Care, Discharge Planning                   Would you like to participate in our [address] service program?  : No - Declined              CM contacted son, Ramon Dumont, baseline information was obtained. CM discussed the role of CM in helping the patient develop a discharge plan and assist the patient in carry out their plan. Ramon Dumont lives with patient and stepfather in [facility]. Stepfather recently had COVID and is suffering long term weakness as a result. Son is a cancer survivor. Son concerned patient had not been ambulating at [facility] nor eating consistently. CM discussed with son discharge planning will be discussed as patient medical status progresses.      CM submitted AIDIN referral to [facility] to research bed availability for patient. CM to follow for CM discharge referral needs.

## 2023-09-13 NOTE — ACP (ADVANCE CARE PLANNING)
Critical Care Advanced Care Planning Note  Mare Morgan 68 y.o. female MRN: [id number]  Unit/Bed#: -01 Encounter: [id number]    Mare Morgan is a 68 y.o. female requiring critical care evaluation and advanced care planning. The patient has chronic comorbidities, including but not limited to Liver cirrhosis secondary to HERNANDEZ, Type II DM, asthma, and class III severe obesity which is now further complicated by the following acute conditions: KANDACE, hepatorenal syndrome, worsening cirrhosis, sepsis and acute metabolic encephalopathy. Due to the severity of the patient's acute condition and/or the extent of chronic conditions, additional conversations pertaining to advanced care planning were required. Today's discussion, which was held in a face-to-face meeting, included Son and . , and it was established that all stake holders understood the rationale for the advanced care planning. The patient was unable to participate in the discussion due to worsening encephalopathy . Summary of Discussion:  I discussed with the patient's family the acute worsening of her renal failure which is likely secondary to decompensated cirrhosis versus sepsis. Son, Lor Figueredo,  expressed that patient would not want mechanical intubation or would not want to have resuscitative measures such as CPR completed. They acknowledged that she would not like to be on dialysis for a lifelong period of time but would be amendable in the acute phase if possible. Discussed concern for mentation, ability to protect airway and WOB. We would trial BiPap to see if there could be some improvement. Family was agreeable. We discussed the central line placement needed for dialysis and risk associated such as bleeding, infection and pneumothorax. Family was understanding and would be willing to have a line placed for dialysis treatment if necessary. Total time spent, (15) minutes (1030 to 1045).       CODE STATUS: DNR/DNI - Level 3  POA: POLST:        SIGNATUREValjean Bumpers, CRNP  DATE: September 13, 2023  TIME: 10:33 AM

## 2023-09-13 NOTE — PROGRESS NOTES
-- Patient:  -- MRN: [id number]  -- Aidin Request ID: [id number]  -- Level of care reserved: [address]  -- Partner Reserved: [place], [address] [place] [address] [phone]  -- Clinical needs requested:  -- Geography searched: 10 miles around [phone]  -- Start of Service:  -- Request sent: 3:43pm EDT on 9/13/2023 by Felix Pond  -- Partner reserved: 4:04pm EDT on 9/13/2023 by Felix Pond  -- Choice list shared: 4:04pm EDT on 9/13/2023 by Felix Pond

## 2023-09-13 NOTE — ASSESSMENT & PLAN NOTE
Patient with worsening hypoxia overnight  Suspect fluid overload versus PNA/Aspiration  Now on 4L  Check ABG  Repeat CXR  NPO  Zosyn to cover possible aspiration PNA as well as alternative infectious etiologies

## 2023-09-13 NOTE — QUICK NOTE
Patient with no measurable urine output overnight s/p Lasix . Parkinson placed , had 30 cc urine output . AM labs noted . Discussed with nephrology on call, will add octreotide and midodrine . Hold lasix . Formal nephrology consult placed.

## 2023-09-13 NOTE — ASSESSMENT & PLAN NOTE
Lab Results   Component Value Date    HGBA1C 6.4 (H) 02/20/2021       Recent Labs     09/12/23  1727 09/12/23  2153 09/13/23  0025 09/13/23  1115   POCGLU 92 99 114 114       Blood Sugar Average: Last 72 hrs:  (P) 104.75     · Continue q 6 accuchecks   · SSI

## 2023-09-13 NOTE — ASSESSMENT & PLAN NOTE
· Worsening gap acidosis  · Secondary to KANDACE   · AB./BE:-18  · CO2: 12     Plan:  · Initiate bicarb drip  · Trend BMPs every 6  · Nephrology following recommendations appreciated  · Concern for need for dialysis initiation

## 2023-09-13 NOTE — ASSESSMENT & PLAN NOTE
· Baseline: 0.82   · Cr. On admission: 2.05   · Initially given IVF fluids when suspected pre-renal however that worsened respiratory status. · Clinically volume overload   · Worsening Cr. And anuria     Plan:   · Nephrology consulted. Recommendations appreciated   · Family agreeable to dialysis if indicated   · Octreotide and bicarb gtt started   · Concern for hepatorenal syndrome as cause for worsening KANDACE   · Bicarb gtt started   · Trend Cr. · Trend BMP q 6 hours   · Monitor I/O   · Daily Weight   · Will likely required CRRT for volume removal in the setting of anuric renal failure.   · Electrolytes stable  · Concern will require dialysis for fluid removal as she is grossly edematous on exam

## 2023-09-13 NOTE — ASSESSMENT & PLAN NOTE
· Unclear etiololgy   · DDX: PNA vs SBP   · WBC: 13.5 - 16.5  · LA: 1.9   · RR:32//Temp: 97.3     Plan:   · BCx2 Pend   · Paracentesis pending with intent to send cx but, ABX started   · Broadened to Zoysn from Ceftriaxone   · No currently hypotensive or LA >4 - No evidence of shock state. · Trend fever and WBC   · Support respiratory effort as mentioned above.   · Procal daily   · UA: negative

## 2023-09-13 NOTE — ASSESSMENT & PLAN NOTE
Patient slightly confused on admission, patient's son reports she has been intermittently confused since her recent admission to [facility] and since she has been in rehab.   AMS has worsened since admission  Suspect possible HE  Ammonia 99  Lactulose started, due to aspiration risk has not received  NG tube ordered for lactulose administration  Check CT Head given worsening AMS, to r/o alternative etiology, stroke low suspicion  Check ABG given worsened respiratory condition

## 2023-09-13 NOTE — CONSULTS
Consultation - Nephrology   Bekah Novoa 68 y.o. female MRN: [id number]  Unit/Bed#: -01 Encounter: [id number]      Assessment/Plan     Assessment / Plan:  1. Renal    The patient has acute renal failure with baseline creatinine in the beginning of this year of 0.8. Presents to the hospital with clinical decompensation deterioration history of cirrhosis and is found to have acute kidney injury. Creatinine is increased to 2.4. She has history of cirrhosis and potentially pneumonia presently as seen on chest x-ray could be causing acute decompensation. Urine sodium was sent and is less than 10 reflecting hyperaldosteronism in the setting could be suggestive of hepatorenal syndrome. We will monitor labs. Potassium was 5.2 but was hemolyzed specimen. Patient placed on octreotide to try and increase effective arterial blood volume  Albumin infusions ordered as is midodrine. Hemodynamic support  To be started on isotonic fluids as below  Diuretics will be ineffective at improving urine output in this situation. 2.  Acid-base    No ABG was done but based on the chemistry from this morning patient has metabolic acidosis with slight increase in anion gap. She is also tachypneic and has cirrhosis and these patients can have chronic respiratory alkalosis with metabolic compensation and she also has renal failure limiting ability to excrete acid loads. On IV fluids with 150 mEq of sodium bicarb per liter can increase to 70 cc/h.    3.  Cirrhosis of the liver    Patient clinically has ascites her abdomen is not tender. She is encephalopathic as well. Primary service aware and placed on medical therapy. 4.  Infectious disease     Reviewing chest x-ray findings there are opacities in the left upper lobe and right lower lobe. Patient is on antibiotics that would cover pneumonia. CT also saw the lower parts of the lung suggesting bilateral diffuse patchy areas of airspace infiltrates.   On broad-spectrum antibiotics. I spoke personally with the attending and we discussed potential monitoring or ICU evaluation. He informing that they were seen last evening by ICU team and he will be monitoring the patient very closely. I called and left a message with her son regarding her hospitalization and that we can discuss her case and will also discuss whether or not dialysis would be something they would want to pursue if medical management is ineffective. History of Present Illness   Physician Requesting Consult: Giancarlo Fabian DO  Reason for Consult / Principal Problem: Acute renal failure and metabolic acidosis  Hx and PE limited by:   HPI: Nasim Bailey is a 68y.o. year old female who has history of cirrhotic liver disease. She is unable to provide history given her clinical state. Reviewing the chart the patient was referred over from her care facility. The patient apparently was in rehabilitation was getting weaker as days were going on became very lethargic dehydrated was not eating. As a result she was referred over to the emergency room and she was found to have elevated creatinine and metabolic acidosis were asked to see her for recommendations. History obtained from chart review and unobtainable from patient due to mental status abnormality. Inpatient consult to Nephrology  Consult performed by: Jose Alberto MD  Consult ordered by: HOLLY Gonzalez          Review of Systems    The patient will open eyes slightly when spoken to but is not able to provide good review of systems or answer questions appropriately. I did ask about pain and she said now. So unable to obtain accurate review of system given her clinical state.     Historical Information   Patient Active Problem List   Diagnosis   • KANDACE (acute kidney injury) ([address])   • Class 3 severe obesity in adult [facility])   • Decompensated hepatic cirrhosis (HCC)   • Metabolic encephalopathy   • Anxiety   • Type 2 diabetes mellitus, without long-term current use of insulin ([address])   • Asthma   • Hypertension   • Ascites     Past Medical History:   Diagnosis Date   • Diabetes mellitus ([address])      History reviewed. No pertinent surgical history. Social History   Social History     Substance and Sexual Activity   Alcohol Use Not Currently     Social History     Substance and Sexual Activity   Drug Use Not Currently     Social History     Tobacco Use   Smoking Status Never   Smokeless Tobacco Never     History reviewed. No pertinent family history.     Meds/Allergies   current meds:   Current Facility-Administered Medications   Medication Dose Route Frequency   • albumin human (FLEXBUMIN) 25 % injection 25 g  25 g Intravenous TID   • albuterol (PROVENTIL HFA,VENTOLIN HFA) inhaler 2 puff  2 puff Inhalation Q4H PRN   • bisacodyl (DULCOLAX) rectal suppository 10 mg  10 mg Rectal PRN   • fluticasone (FLONASE) 50 mcg/act nasal spray 1 spray  1 spray Nasal Daily   • heparin (porcine) subcutaneous injection 5,000 Units  5,000 Units Subcutaneous Q8H [address]   • insulin lispro (HumaLOG) 100 units/mL subcutaneous injection 2-12 Units  2-12 Units Subcutaneous Q6H   • lactulose oral solution 20 g  20 g Oral BID   • loratadine (CLARITIN) tablet 10 mg  10 mg Oral Daily   • midodrine (PROAMATINE) tablet 5 mg  5 mg Oral TID AC   • montelukast (SINGULAIR) tablet 10 mg  10 mg Oral Daily   • octreotide (SandoSTATIN) injection 100 mcg  100 mcg Intravenous Q8H [address]   • ondansetron (ZOFRAN-ODT) dispersible tablet 4 mg  4 mg Oral Q8H PRN   • pantoprazole (PROTONIX) EC tablet 20 mg  20 mg Oral Early Morning   • piperacillin-tazobactam (ZOSYN) 3.375 g in sodium chloride 0.9 % 100 mL IVPB  3.375 g Intravenous Q6H   • saccharomyces boulardii (FLORASTOR) capsule 250 mg  250 mg Oral Daily   • sertraline (ZOLOFT) tablet 200 mg  200 mg Oral Daily   • sodium bicarbonate 150 mEq in dextrose 5 % 1,000 mL infusion  50 mL/hr Intravenous Continuous     No Known Allergies    Objective     Intake/Output Summary (Last 24 hours) at 9/13/2023 0857  Last data filed at 9/13/2023 0539  Gross per 24 hour   Intake 04266 ml   Output 30 ml   Net 89846 ml     Body mass index is 40.33 kg/m². Invasive Devices:   Urethral Catheter Latex 18 Fr. (Active)   Reasons to continue Urinary Catheter  Accurate I&O assessment in critically ill patients (48 hr. max) 09/13/23 0537   Goal for Removal Remove after 48 hrs of I/O monitoring 09/13/23 0537   Site Assessment Clean;Skin intact; Pink 09/13/23 0537   Collection Container Standard drainage bag 09/13/23 0537   Securement Method Securing device (Describe) 09/13/23 0537   Securing Device Change Date 09/20/23 09/13/23 0537   Output (mL) 30 mL 09/13/23 0537       PHYSICAL EXAM:  BP (!) 124/48   Pulse 70   Temp (!) 97.3 °F (36.3 °C)   Resp (!) 28   Ht 5' 7" (1.702 m)   Wt 117 kg (257 lb 8 oz)   SpO2 91%   BMI 40.33 kg/m²     Physical Exam  Constitutional:       Appearance: She is ill-appearing. She is not toxic-appearing. Comments: Lethargic opens eyes slightly   HENT:      Head: Normocephalic and atraumatic. Nose: Nose normal.      Mouth/Throat:      Mouth: Mucous membranes are dry. Eyes:      General: No scleral icterus. Cardiovascular:      Rate and Rhythm: Normal rate and regular rhythm. Heart sounds: No friction rub. No gallop. Pulmonary:      Breath sounds: No rhonchi or rales. Comments: Tachypneic  Abdominal:      General: Bowel sounds are normal. There is no distension. Palpations: Abdomen is soft. Tenderness: There is no abdominal tenderness. Comments: Positive ascites. Skin:     General: Skin is warm and dry. Neurological:      Comments: Lethargic unable to assess accurately as patient cannot cooperate given her clinical state. Psychiatric:      Comments: Lethargic.            Current Weight: Weight - Scale: 117 kg (257 lb 8 oz)  First Weight: Weight - Scale: 118 kg (259 lb 11.2 oz)    Lab Results:    Results from last 7 days   Lab Units 09/13/23  0612   WBC Thousand/uL 16.65*   HEMOGLOBIN g/dL 12.3   HEMATOCRIT % 40.5   PLATELETS Thousands/uL 63*     Results from last 7 days   Lab Units 09/13/23  0534   POTASSIUM mmol/L 5.2   CHLORIDE mmol/L 112*   CO2 mmol/L 12*   BUN mg/dL 78*   CREATININE mg/dL 2.44*   CALCIUM mg/dL 8.6     Results from last 7 days   Lab Units 09/13/23  0534   POTASSIUM mmol/L 5.2   CHLORIDE mmol/L 112*   CO2 mmol/L 12*   BUN mg/dL 78*   CREATININE mg/dL 2.44*   CALCIUM mg/dL 8.6   ALK PHOS U/L 105*   ALT U/L 44   AST U/L 88*

## 2023-09-13 NOTE — SEPSIS NOTE
Sepsis Note   Dat Coleman 68 y.o. female MRN: [id number]  Unit/Bed#: -01 Encounter: [id number]       Initial Sepsis Screening     [address] Name 09/13/23 1030                Is the patient's history suggestive of a new or worsening infection? Yes (Proceed)  -SS        Suspected source of infection pneumonia;suspect infection, source unknown  -SS        Indicate SIRS criteria Tachypnea > 20 resp per min;Leukocytosis (WBC > 21054 IJL) OR Leukopenia (WBC <4000 IJL) OR Bandemia (WBC >10% bands)  -SS        Are two or more of the above signs & symptoms of infection both present and new to the patient? Yes (Proceed)  -SS        Assess for evidence of organ dysfunction: Are any of the below criteria present within 6 hours of suspected infection and SIRS criteria that are NOT considered to be chronic conditions? Creatinine > 2. 0;Creatinine > 2.0 AND > 0.5 above baseline;Platelet count < 356,821;UCNUA output < 0.5 mL/kg/hour for 2 hours; New need for invasive/non-invasive ventilation  -        Date of presentation of severe sepsis 09/13/23  -        Time of presentation of severe sepsis 1030  -SS        Sepsis Note: Click "NEXT" below (NOT "close") to generate sepsis note based on above information. YES (proceed by clicking "NEXT")  -              User Key  (r) = Recorded By, (t) = Taken By, (c) = Cosigned By    [address] [place] [address] Name Provider Type    SS HOLLY Wilson Nurse Practitioner                Default Flowsheet Data (last 720 hours)     Sepsis Reassess     [address] Name 09/13/23 1444                   Repeat Volume Status and Tissue Perfusion Assessment Performed    Date of Reassessment: 09/13/23  -        Time of Reassessment: 6410  -SS        Sepsis Reassessment Note: Click "NEXT" below (NOT "close") to generate sepsis reassessment note.  YES (proceed by clicking "NEXT")  -        Repeat Volume Status and Tissue Perfusion Assessment Performed --              User Key  (r) = Recorded By, (t) = Taken By, (c) = Cosigned By    [address] [facility] Name Provider Type    SS HOLLY Friedman Nurse Practitioner                Body mass index is 40.33 kg/m². Wt Readings from Last 1 Encounters:   09/12/23 117 kg (257 lb 8 oz)     IBW (Ideal Body Weight): 61.6 kg    Ideal body weight: 61.6 kg (135 lb 12.9 oz)  Adjusted ideal body weight: 83.7 kg (184 lb 7.7 oz)     Hemodynamically stable. Wiring BiPAP for work of breathing.

## 2023-09-13 NOTE — ASSESSMENT & PLAN NOTE
Due to renal failure/KANDACE  Bicarb gtt started, low rate to avoid further fluid overload/free fluid  Nephrology following No

## 2023-09-13 NOTE — ASSESSMENT & PLAN NOTE
Baseline 0.82  Cr on admission 2.05  Initially suspected pre-renal in ED, given 2L IVF, worsened tachypnea after given IVF  Clinically she is volume overloaded, CT consistent with b/l pulm edema, significant portal hypertension and ascites  Suspect Hepatorenal  Albumin 25g TID  Octreotide and Midodrine started overnight  Cr worsened, Anuric with only 30cc UOP overnight after clayton placement  Trend Cr, I/O, daily weights  Nephrology consulted, appreciate recommendations

## 2023-09-13 NOTE — ASSESSMENT & PLAN NOTE
· Secondary to liver cirrhosis d/t HERNANDEZ   · CT Abdomen: Large vL ascites. Extensive bilateral patchy airspace consolidation in the mid and lower lung zones. B/L Flank edema   · Concern for SBP     Plan:   · Discontinue diuresis in the setting of worsening Cr. · Nephrology consulted   · Albumin and Octreotide started.    · IR for paracentesis with labs pending

## 2023-09-13 NOTE — CONSULTS
Consultation - GI   Martine Cordova 68 y.o. female MRN: [id number]  Unit/Bed#: -01 Encounter: [id number]      Assessment/Plan     Assessment: 73F with history of HERNANDEZ cirrhosis (MELD 21) admitted from nursing home d/t increased lethargy and dehydration found to have KANDACE and concern for hepatorenal syndrome for which GI is consulted. Patient seems to have had relatively well-compensated liver disease prior to admission and therefore is unclear whether presentation is 2/2 HRS. However will defer management of renal failure to nephrology service. Plan:  - Management of renal failure as per nephrology  - No need to routinely check ammonia. Can administer lactulose Q3H via NGT for empiric treatment of hepatic encephalopathy.  - Continue empiric treatment of SBP. Diagnostic paracentesis may be of limited utility since she has been started on antibiotics but may be helpful to check total protein, albumin, fluid cell count & diff.   - Continue goals of care discussions for poor prognosis      History of Present Illness   Physician Requesting Consult: Maile Habermann, MD  Reason for Consult / Principal Problem: Cirrhosis  Hx and PE limited by: Altered mental status    HPI: 73F with history of HERNANDEZ cirrhosis admitted from nursing home d/t increased lethargy and dehydration found to have KANDACE and concern for hepatorenal syndrome for which GI is consulted. Patient is not able to answer questions and therefore history is obtained from chart review. On admission she was found to have a Cr of 2.05 from baseline 0.82 in January. Tbili was 2.06 from 1.83 in January and AST//53. Patient was diagnosed with cirrhosis in 2021 2/2 HERNANDEZ. On admission urine sodium was found to be <10 suggesting KANDACE/HRS. She had been given IV fluid resuscitation as well as bicarb drip as well as albumin 25 g TID, octreotide and midodrine but she has remained anuric. She was also started on antibiotics for empiric SBP treatment.  She is DNR/DNI though family is open to considering temporary dialysis. Review of Systems   Review of Systems could not be obtained d/t patient's altered mental status    Historical Information   Past Medical History:   Diagnosis Date   • Diabetes mellitus ([address])      History reviewed. No pertinent surgical history. Social History   Social History     Substance and Sexual Activity   Alcohol Use Not Currently     Social History     Substance and Sexual Activity   Drug Use Not Currently     E-Cigarette/Vaping   • E-Cigarette Use Never User      E-Cigarette/Vaping Substances     Social History     Tobacco Use   Smoking Status Never   Smokeless Tobacco Never     Family History: non-contributory    Meds/Allergies   all current active meds have been reviewed    No Known Allergies    Objective   Vitals:    09/13/23 1300   BP: 116/54   Pulse: 70   Resp: (!) 44   Temp:    SpO2: 96%     Gen: Critically ill-appearing on bipap  CV: RRR  Pulm: Tachypnic  Abd: Soft, NT, ND, normal BS  Ext: B/l LE edema  Neuro: Obtunded      Intake/Output Summary (Last 24 hours) at 9/13/2023 1324  Last data filed at 9/13/2023 1045  Gross per 24 hour   Intake 52526 ml   Output 50 ml   Net 99560 ml       Invasive Devices:   Peripheral IV 09/12/23 Right;Ventral (anterior) Hand (Active)   Site Assessment WDL;Dry;Clean; Intact 09/13/23 1100   Dressing Type Transparent 09/13/23 1100   Line Status Flushed 09/13/23 1100   Dressing Status Clean;Dry; Intact 09/13/23 1100   Dressing Change Due 09/16/23 09/13/23 1100   Reason Not Rotated Not due 09/12/23 2039       Peripheral IV 09/12/23 Proximal;Right;Upper;Ventral (anterior) Arm (Active)   Site Assessment WDL; Clean;Dry; Intact 09/13/23 1100   Dressing Type Transparent 09/13/23 1100   Line Status Flushed 09/13/23 1100   Dressing Status Clean;Dry; Intact 09/13/23 1100   Dressing Change Due 09/16/23 09/13/23 1100   Reason Not Rotated Not due 09/12/23 2039       Urethral Catheter Latex 18 Fr.  (Active)   Reasons to continue Urinary Catheter  Accurate I&O assessment in critically ill patients (48 hr. max) 09/13/23 1045   Goal for Removal Remove after 48 hrs of I/O monitoring 09/13/23 1045   Site Assessment Clean;Skin intact 09/13/23 1045   Parkinson Care Done 09/13/23 1045   Collection Container Standard drainage bag 09/13/23 1045   Securement Method Securing device (Describe) 09/13/23 1045   Securing Device Change Date 09/20/23 09/13/23 1045   Output (mL) 20 mL 09/13/23 1045       Physical Exam    Lab Results: I have personally reviewed pertinent reports. Imaging Studies: I have personally reviewed pertinent reports. EKG, Pathology, and Other Studies: I have personally reviewed pertinent reports. Counseling / Coordination of Care  Total floor / unit time spent today 55 minutes. Greater than 50% of total time was spent with the patient and / or family counseling and / or coordination of care.

## 2023-09-13 NOTE — ASSESSMENT & PLAN NOTE
· Known liver cirrhosis d/t HERNANDEZ   · CT abdomen with concern for large vL ascites.    · Worsening mentation and respiratory status  · MELD: 19     Plan:   · Consult to GI and Nephrology   · Albumin q 6   · Octreotide IV   · May require dialysis for fluid removal  · Pending paracentesis of large volume ascites

## 2023-09-13 NOTE — ASSESSMENT & PLAN NOTE
· Patient was slightly confused on admission however confusion has worsened. · Family states that she has been confused over the last 2 weeks at the nursing home. · Likely secondary to elevated ammonia level and metabolic acidosis in the setting of sepsis. · CT head: Negative for acute intracranial process. Reported prominent ventricles. Imaging seems about similar from previous scan.    · Would consider MRI of brain if decreased mentation continues     Plan:   · Administer lactulose rectally  · Trend ammonia level  · Neuro check Q2

## 2023-09-14 NOTE — PROGRESS NOTES
[address] [facility]  Progress Note  Name: Yanci Francisco  MRN: [id number]  Unit/Bed#: -01 I Date of Admission: 9/12/2023   Date of Service: 9/14/2023 I Hospital Day: 2    Assessment/Plan   Sepsis ([address])  Assessment & Plan  · Unclear etiololgy   · DDX: PNA vs SBP   · WBC: 13.5 - 16.5  · LA: 1.9   · RR:32//Temp: 97.3     Plan:   · BCx2 Pend   · Paracentesis 3.5 liters cultures are pending   · Broadened to Zoysn from Ceftriaxone   · No currently hypotensive or LA >4 - No evidence of shock state. · Trend fever and WBC   · Support respiratory effort as mentioned above. · Procal daily   · UA: negative     Acute respiratory failure with hypoxia (HCC)  Assessment & Plan  · DDX: Volume overload versus pneumonia  · Worsening hypoxia overnight since admission. · Increased work of breathing with abdominal muscle use  · ABG revealed metabolic acidosis  · CT Chest: Extensive bilateral patchy airspace consolidation in the mid and lower lung zones as detailed above. Correlate with clinical findings for disseminated infectious etiology versus edema/ARDS. Large volume ascites. Findings of cirrhosis and portal hypertension as described. Bilateral flank edema. Plan:  · Antibiotics broadened to Zosyn  · Consider additional vancomycin  · Repeat chest x-ray revealed worsening bilateral opacifications unclear with worsening pneumonia or worsening pulmonary edema  · Placed on BiPAP for work of breathing  · BiPAP was initiated 16/8 at 80% for hypoxia - miss match between ABG on Po2 on monitor increased to 100 %   · Patient was on high flow 90% and 50 L did not tolerate had to be placed on BiPAP desatted into the 80s  · Wean oxygen as able.      Risk for SBP (spontaneous bacterial peritonitis) (HCC)  Assessment & Plan  · Large vL ascites on CT scan   · Worsening WBCs    Plan:   · Trend fever and WBC curve   · ABX broadened   · Pending paracentesis with cultures     Acute metabolic acidosis  Assessment & Plan  · Worsening gap acidosis  · Secondary to KANDACE   · AB./BE:-18  · CO2: 12     Plan:  · Initiate bicarb drip  · Trend BMPs every 6  · Nephrology following  · Placed hemodialysis catheter  · Started CRRT  · Contacted on-call nephrologist    Hepatorenal syndrome [facility])  Assessment & Plan  · Risk for hepatorenal   · See plan as stated above   · On dialysis and levophed       Ascites  Assessment & Plan  · Secondary to liver cirrhosis d/t HERNANDEZ   · CT Abdomen: Large vL ascites. Extensive bilateral patchy airspace consolidation in the mid and lower lung zones. B/L Flank edema   · Concern for SBP     Plan:   · Discontinue diuresis in the setting of worsening Cr. · Nephrology consulted   · Albumin and Octreotide started. · IR for paracentesis - 3.5 liters removed cultures sent     Hypertension  Assessment & Plan  · Holding antihypertensive agents     Asthma  Assessment & Plan  · No PFTs on file  · Nebs PRN   · No wheezing on exam     Type 2 diabetes mellitus, without long-term current use of insulin [facility])  Assessment & Plan  Lab Results   Component Value Date    HGBA1C 6.4 (H) 2021       Recent Labs     23  0025 23  1115 23  1811 23  0024   POCGLU 114 114 170* 168*       Blood Sugar Average: Last 72 hrs:  (P) 535.5210192506471592     · Continue q 6 accuchecks   · SSI     Anxiety  Assessment & Plan  · Continue Zoloft when able to PO     Metabolic encephalopathy  Assessment & Plan  · Patient was slightly confused on admission however confusion has worsened. · Family states that she has been confused over the last 2 weeks at the nursing home. · Likely secondary to elevated ammonia level and metabolic acidosis in the setting of sepsis. · CT head: Negative for acute intracranial process. Reported prominent ventricles. Imaging seems about similar from previous scan.    · Would consider MRI of brain if decreased mentation continues     Plan:   · Administer lactulose rectally  · Trend ammonia level  · Neuro check Q2        Decompensated hepatic cirrhosis (HCC)  Assessment & Plan  · Known liver cirrhosis d/t HERNANDEZ   · CT abdomen with concern for large vL ascites. · Worsening mentation and respiratory status  · MELD: 19     Plan:   · Consult to GI and Nephrology   · Albumin q 6   · Octreotide IV   · On CRRT  · S/p para 3.5 liters removed     Class 3 severe obesity in adult [facility])  Assessment & Plan  · Consult on lifestyle modifications when appropriate. * KANDACE (acute kidney injury) ([address])  Assessment & Plan  · Baseline: 0.82   · Cr. On admission: 2.05   · Initially given IVF fluids when suspected pre-renal however that worsened respiratory status. · Clinically volume overload   · Worsening Cr. And anuria     Plan:   · Nephrology consulted   · Octreotide and bicarb gtt started   · Concern for hepatorenal syndrome as cause for worsening KANDACE   · Bicarb gtt started   · Trend Cr. · Trend BMP q 6 hours   · Monitor I/O   · Daily Weight   · started CRRT for volume removal/ metabolic acidosis in the setting of anuric renal failure. · Electrolytes stable             ICU Core Measures     A: Assess, Prevent, and Manage Pain · Has pain been assessed? Yes  · Need for changes to pain regimen? NA   B: Both SAT/SAT  · N/A   C: Choice of Sedation · RASS Goal: 0 Alert and Calm or N/A patient not on sedation  · Need for changes to sedation or analgesia regimen? No   D: Delirium · CAM-ICU: Positive   E: Early Mobility  · Plan for early mobility? Yes   F: Family Engagement · Plan for family engagement today? Yes       Antibiotic Review: Awaiting culture results. Review of Invasive Devices: Parkinson Plan: Continue for accurate I/O monitoring for 48 hours  Central access plan: HD cath in place.   Plan continue CRRT      Prophylaxis:  VTE VTE covered by:  heparin (porcine), Subcutaneous, 5,000 Units at 09/13/23 2300       Stress Ulcer  not ordered       Subjective   HPI/24hr events:   · Patient was placed on CRRT, dialysis catheter was placed upgraded to ICU Levophed was added  · She is requiring BiPAP 16/8 100%   · CRRT is running negative 50   · Continue bicarb drip at 79 for now  · Miss match between ABG and PO2 on monitor PO2 on ABG was 56 during the monitor reading was 92 %   · VBG was performed from dialysis cath - 67 %   · Patient remains on the BiPAP breathing between 40-50 times a minute, discussion today with the family about continuation if respiratory status does not improve   · Chest x-ray shows grossly volume overloaded with an ARDS-like picture - family discussion if no improvement soon - maxed out on bipap         ROS unable to perform         Objective          Pt is not providing any information                      Vitals I/O      Most Recent Min/Max in 24hrs   Temp (!) 96.6 °F (35.9 °C) Temp  Min: 96.6 °F (35.9 °C)  Max: 97.8 °F (36.6 °C)   Pulse 99 Pulse  Min: 65  Max: 99   Resp (!) 41 Resp  Min: 28  Max: 50   /65 BP  Min: 98/52  Max: 159/73   O2 Sat 90 % SpO2  Min: 88 %  Max: 98 %      Intake/Output Summary (Last 24 hours) at 9/14/2023 0538  Last data filed at 9/14/2023 0300  Gross per 24 hour   Intake 896.67 ml   Output 2205 ml   Net -1308.33 ml         Diet NPO     Invasive Monitoring Physical exam   Arterial Line  Reyna /54  Arterial Line BP  Min: 83/37  Max: 156/49   MAP 86 mmHg  Arterial Line MAP (mmHg)  Min: 56 mmHg  Max: 86 mmHg    Physical Exam  Eyes:      Extraocular Movements: Extraocular movements intact. Skin:     General: Skin is warm and dry. Comments: Bilateral arm erythematous, also abdomen or heparin shot is bruising   HENT:      Head: Normocephalic and atraumatic. Mouth/Throat:      Mouth: Mucous membranes are dry. Neck:     Vascular: JVD present. Cardiovascular:      Rate and Rhythm: Normal rate and regular rhythm. Pulses: Normal pulses. Musculoskeletal:         General: Swelling present. Right lower leg: 3+ Edema present.       Left lower leg: 3+ Edema present. Abdominal:      General: Bowel sounds are normal.      Palpations: Abdomen is soft. Constitutional:       General: She is in acute distress. Appearance: She is morbidly obese. She is ill-appearing and toxic-appearing. Interventions: Face mask in place. Pulmonary:      Effort: Tachypnea, accessory muscle usage and accessory muscle usage present. Breath sounds: Examination of the right-middle field reveals decreased breath sounds. Examination of the right-lower field reveals decreased breath sounds. Examination of the left-lower field reveals decreased breath sounds. Decreased breath sounds present. Comments: Coarse breath sounds throughout  Neurological:      Mental Status: She is lethargic, CAM ICU positive  and somnolent. Genitourinary/Anorectal:  FoleyVitals and nursing note reviewed. Diagnostic Studies      EKG: NSR  Imaging:  I have personally reviewed pertinent reports.        Medications:  Scheduled PRN   Albumin 25%, 25 g, TID  chlorhexidine, 15 mL, Q12H LANDEN  heparin (porcine), 5,000 Units, Q8H [address]  insulin lispro, 2-12 Units, Q6H  lactulose, 200 g, TID  midodrine, 5 mg, TID AC  octreotide, 100 mcg, Q8H LANDEN  piperacillin-tazobactam, 3.375 g, Q6H      ondansetron, 4 mg, Q8H PRN       Continuous    norepinephrine, 1-30 mcg/min, Last Rate: 2 mcg/min (09/14/23 0515)  NxStage K 2/Ca 3, 20,000 mL  sodium bicarbonate 150 mEq in dextrose 5 % 1,000 mL infusion, 70 mL/hr, Last Rate: 70 mL/hr (09/14/23 0418)         Labs:    CBC    Recent Labs     09/13/23  0612 09/14/23  0311 09/14/23  0425   WBC 16.65*  --  22.70*   HGB 12.3 11.9 11.7   HCT 40.5 35 37.0   PLT 63*  --  123*     BMP    Recent Labs     09/13/23  2048 09/14/23  0017 09/14/23  0311   SODIUM 144 144  --    K 4.4 4.5  --    * 111*  --    CO2 16* 16* 19*   AGAP 16 17  --    BUN 85* 83*  --    CREATININE 2.99* 3.00*  --    CALCIUM 8.5 8.6  --        Oneil    Recent Labs     09/12/23  3995 09/13/23  0534 09/14/23  0024   INR 1.51* 1.60* 2.00*   PTT 23  --   --         Additional Electrolytes  Recent Labs     09/14/23  0017 09/14/23  0311 09/14/23  0425 09/14/23  0435   MG 2.0  --   --  2.0   PHOS 6.2*  --   --  5.4*   CAIONIZED 1.11* 1.24 1.13  --           Blood Gas    Recent Labs     09/14/23  0214   PHART 7.243*   IOQ8EHT 41.3   PO2ART 57.9*   RZO6XWO 17.4*   BEART -9.4   SOURCE Line, Arterial     Recent Labs     09/13/23  2245 09/14/23  0214   PHVEN 7.225*  --    QVL0IBH 46.0  --    PO2VEN 38.0  --    ICW2DXE 18.6*  --    BEVEN -8.8  --    SOURCE  --  Line, Arterial    LFTs  Recent Labs     09/12/23  1320 09/13/23  0534   ALT 53* 44   * 88*   ALKPHOS 104 105*   ALB 2.9* 2.9*   TBILI 2.06* 1.98*       Infectious  No recent results  Glucose  Recent Labs     09/13/23  1240 09/13/23  1726 09/13/23  2048 09/14/23  0017   GLUC 111 149* 166* 167*                   Johnny Pizano PA-C

## 2023-09-14 NOTE — PLAN OF CARE
Problem: MOBILITY - ADULT  Goal: Maintain or return to baseline ADL function  Description: INTERVENTIONS:  -  Assess patient's ability to carry out ADLs; assess patient's baseline for ADL function and identify physical deficits which impact ability to perform ADLs (bathing, care of mouth/teeth, toileting, grooming, dressing, etc.)  - Assess/evaluate cause of self-care deficits   - Assess range of motion  - Assess patient's mobility; develop plan if impaired  - Assess patient's need for assistive devices and provide as appropriate  - Encourage maximum independence but intervene and supervise when necessary  - Involve family in performance of ADLs  - Assess for home care needs following discharge   - Consider OT consult to assist with ADL evaluation and planning for discharge  - Provide patient education as appropriate  Outcome: Not Progressing  Goal: Maintains/Returns to pre admission functional level  Description: INTERVENTIONS:  - Perform BMAT or MOVE assessment daily.   - Set and communicate daily mobility goal to care team and patient/family/caregiver. - Collaborate with rehabilitation services on mobility goals if consulted  - Perform Range of Motion 2 times a day. - Reposition patient every 2 hours.   - Dangle patient 2 times a day  - Stand patient 2 times a day  - Ambulate patient 2 times a day  - Out of bed to chair 2 times a day   - Out of bed for meals 2 times a day  - Out of bed for toileting  - Record patient progress and toleration of activity level   Outcome: Not Progressing     Problem: Prexisting or High Potential for Compromised Skin Integrity  Goal: Skin integrity is maintained or improved  Description: INTERVENTIONS:  - Identify patients at risk for skin breakdown  - Assess and monitor skin integrity  - Assess and monitor nutrition and hydration status  - Monitor labs   - Assess for incontinence   - Turn and reposition patient  - Assist with mobility/ambulation  - Relieve pressure over bony prominences  - Avoid friction and shearing  - Provide appropriate hygiene as needed including keeping skin clean and dry  - Evaluate need for skin moisturizer/barrier cream  - Collaborate with interdisciplinary team   - Patient/family teaching  - Consider wound care consult   Outcome: Not Progressing     Problem: PAIN - ADULT  Goal: Verbalizes/displays adequate comfort level or baseline comfort level  Description: Interventions:  - Encourage patient to monitor pain and request assistance  - Assess pain using appropriate pain scale  - Administer analgesics based on type and severity of pain and evaluate response  - Implement non-pharmacological measures as appropriate and evaluate response  - Consider cultural and social influences on pain and pain management  - Notify physician/advanced practitioner if interventions unsuccessful or patient reports new pain  Outcome: Not Progressing     Problem: INFECTION - ADULT  Goal: Absence or prevention of progression during hospitalization  Description: INTERVENTIONS:  - Assess and monitor for signs and symptoms of infection  - Monitor lab/diagnostic results  - Monitor all insertion sites, i.e. indwelling lines, tubes, and drains  - Monitor endotracheal if appropriate and nasal secretions for changes in amount and color  - Watson appropriate cooling/warming therapies per order  - Administer medications as ordered  - Instruct and encourage patient and family to use good hand hygiene technique  - Identify and instruct in appropriate isolation precautions for identified infection/condition  Outcome: Not Progressing  Goal: Absence of fever/infection during neutropenic period  Description: INTERVENTIONS:  - Monitor WBC    Outcome: Not Progressing     Problem: SAFETY ADULT  Goal: Maintain or return to baseline ADL function  Description: INTERVENTIONS:  -  Assess patient's ability to carry out ADLs; assess patient's baseline for ADL function and identify physical deficits which impact ability to perform ADLs (bathing, care of mouth/teeth, toileting, grooming, dressing, etc.)  - Assess/evaluate cause of self-care deficits   - Assess range of motion  - Assess patient's mobility; develop plan if impaired  - Assess patient's need for assistive devices and provide as appropriate  - Encourage maximum independence but intervene and supervise when necessary  - Involve family in performance of ADLs  - Assess for home care needs following discharge   - Consider OT consult to assist with ADL evaluation and planning for discharge  - Provide patient education as appropriate  Outcome: Not Progressing  Goal: Maintains/Returns to pre admission functional level  Description: INTERVENTIONS:  - Perform BMAT or MOVE assessment daily.   - Set and communicate daily mobility goal to care team and patient/family/caregiver. - Collaborate with rehabilitation services on mobility goals if consulted  - Perform Range of Motion 2 times a day. - Reposition patient every 2 hours.   - Dangle patient 2 times a day  - Stand patient 2 times a day  - Ambulate patient 2 times a day  - Out of bed to chair 2 times a day   - Out of bed for meals 2 times a day  - Out of bed for toileting  - Record patient progress and toleration of activity level   Outcome: Not Progressing  Goal: Patient will remain free of falls  Description: INTERVENTIONS:  - Educate patient/family on patient safety including physical limitations  - Instruct patient to call for assistance with activity   - Consult OT/PT to assist with strengthening/mobility   - Keep Call bell within reach  - Keep bed low and locked with side rails adjusted as appropriate  - Keep care items and personal belongings within reach  - Initiate and maintain comfort rounds  - Make Fall Risk Sign visible to staff  - Offer Toileting every 2 Hours, in advance of need  - Initiate/Maintain bed alarm  - Obtain necessary fall risk management equipment: walker, sit to stand, yellow socks    - Apply yellow socks and bracelet for high fall risk patients  - Consider moving patient to room near nurses station  Outcome: Progressing     Problem: DISCHARGE PLANNING  Goal: Discharge to home or other facility with appropriate resources  Description: INTERVENTIONS:  - Identify barriers to discharge w/patient and caregiver  - Arrange for needed discharge resources and transportation as appropriate  - Identify discharge learning needs (meds, wound care, etc.)  - Arrange for interpretive services to assist at discharge as needed  - Refer to Case Management Department for coordinating discharge planning if the patient needs post-hospital services based on physician/advanced practitioner order or complex needs related to functional status, cognitive ability, or social support system  Outcome: Progressing     Problem: Knowledge Deficit  Goal: Patient/family/caregiver demonstrates understanding of disease process, treatment plan, medications, and discharge instructions  Description: Complete learning assessment and assess knowledge base.   Interventions:  - Provide teaching at level of understanding  - Provide teaching via preferred learning methods  Outcome: Progressing     Problem: RESPIRATORY - ADULT  Goal: Achieves optimal ventilation and oxygenation  Description: INTERVENTIONS:  - Assess for changes in respiratory status  - Assess for changes in mentation and behavior  - Position to facilitate oxygenation and minimize respiratory effort  - Oxygen administered by appropriate delivery if ordered  - Initiate smoking cessation education as indicated  - Encourage broncho-pulmonary hygiene including cough, deep breathe, Incentive Spirometry  - Assess the need for suctioning and aspirate as needed  - Assess and instruct to report SOB or any respiratory difficulty  - Respiratory Therapy support as indicated  Outcome: Not Progressing     Problem: METABOLIC, FLUID AND ELECTROLYTES - ADULT  Goal: Electrolytes maintained within normal limits  Description: INTERVENTIONS:  - Monitor labs and assess patient for signs and symptoms of electrolyte imbalances  - Administer electrolyte replacement as ordered  - Monitor response to electrolyte replacements, including repeat lab results as appropriate  - Instruct patient on fluid and nutrition as appropriate  Outcome: Not Progressing  Goal: Fluid balance maintained  Description: INTERVENTIONS:  - Monitor labs   - Monitor I/O and WT  - Instruct patient on fluid and nutrition as appropriate  - Assess for signs & symptoms of volume excess or deficit  Outcome: Not Progressing  Goal: Glucose maintained within target range  Description: INTERVENTIONS:  - Monitor Blood Glucose as ordered  - Assess for signs and symptoms of hyperglycemia and hypoglycemia  - Administer ordered medications to maintain glucose within target range  - Assess nutritional intake and initiate nutrition service referral as needed  Outcome: Progressing     Problem: Nutrition/Hydration-ADULT  Goal: Nutrient/Hydration intake appropriate for improving, restoring or maintaining nutritional needs  Description: Monitor and assess patient's nutrition/hydration status for malnutrition. Collaborate with interdisciplinary team and initiate plan and interventions as ordered. Monitor patient's weight and dietary intake as ordered or per policy. Utilize nutrition screening tool and intervene as necessary. Determine patient's food preferences and provide high-protein, high-caloric foods as appropriate.      INTERVENTIONS:  - Monitor oral intake, urinary output, labs, and treatment plans  - Assess nutrition and hydration status and recommend course of action  - Evaluate amount of meals eaten  - Assist patient with eating if necessary   - Allow adequate time for meals  - Recommend/ encourage appropriate diets, oral nutritional supplements, and vitamin/mineral supplements  - Order, calculate, and assess calorie counts as needed  - Recommend, monitor, and adjust tube feedings and TPN/PPN based on assessed needs  - Assess need for intravenous fluids  - Provide specific nutrition/hydration education as appropriate  - Include patient/family/caregiver in decisions related to nutrition  Outcome: Not Progressing

## 2023-09-14 NOTE — ASSESSMENT & PLAN NOTE
Lab Results   Component Value Date    HGBA1C 6.4 (H) 02/20/2021       Recent Labs     09/13/23  0025 09/13/23  1115 09/13/23  1811 09/14/23  0024   POCGLU 114 114 170* 168*       Blood Sugar Average: Last 72 hrs:  (P) [id number]     · Continue q 6 accuchecks   · SSI

## 2023-09-14 NOTE — PROCEDURES
Arterial Line Insertion    Date/Time: 9/14/2023 5:40 AM    Performed by: Leyla Stovall PA-C  Authorized by: Leyla Stovall PA-C    Patient location:  ICU  Consent:     Consent obtained:  Emergent situation  Universal protocol:     Patient identity confirmed:  Hospital-assigned identification number, anonymous protocol, patient vented/unresponsive and arm band  Indications:     Indications: hemodynamic monitoring, multiple ABGs and frequent labs / infusion    Pre-procedure details:     Skin preparation:  Betadine    Preparation: Patient was prepped and draped in sterile fashion    Anesthesia (see MAR for exact dosages): Anesthesia method:  Local infiltration    Local anesthetic:  Lidocaine 1% w/o epi  Procedure details:     Location / Tip of Catheter:  Radial    Laterality:  Left    Raymundo's test performed: yes      Raymundo's test abnormal: no      Needle gauge:  20 G    Placement technique:  Endovascular    Number of attempts:  1    Successful placement: yes      Transducer: waveform confirmed    Post-procedure details:     Post-procedure:  Secured with tape, sterile dressing applied, wrist guard applied and sutured    CMS:  Normal    Patient tolerance of procedure:   Tolerated well, no immediate complications

## 2023-09-14 NOTE — PROCEDURES
Temporary HD Catheter    Date/Time: 9/13/2023 9:17 PM    Performed by: Brenton Benz PA-C  Authorized by: Brenton Benz PA-C    Patient location:  Bedside  Consent:     Consent obtained:  Written    Consent given by:  Healthcare agent    Risks discussed:  Arterial puncture, incorrect placement, nerve damage, bleeding, infection and pneumothorax    Alternatives discussed:  No treatment  Universal protocol:     Patient identity confirmed:  Hospital-assigned identification number, arm band and anonymous protocol, patient vented/unresponsive  Pre-procedure details:     Hand hygiene: Hand hygiene performed prior to insertion      Sterile barrier technique: All elements of maximal sterile technique followed      Skin preparation:  Betadine    Skin preparation agent: Skin preparation agent completely dried prior to procedure    Indications:     Central line indications: dialysis    Anesthesia (see MAR for exact dosages): Anesthesia method:  Local infiltration    Local anesthetic:  Lidocaine 1% w/o epi  Procedure details:     Location:  Right internal jugular    Vessel type: vein      Laterality:  Right    Approach: percutaneous technique used      Patient position:  Trendelenburg    Catheter type:  Double lumen    Catheter size:  12.5 Fr    Catheter length:  16 cm    Landmarks identified: yes      Ultrasound guidance: yes      Ultrasound image availability:  Images available in PACS    Sterile ultrasound techniques: Sterile gel and sterile probe covers were used      Number of attempts:  1    Successful placement: yes    Post-procedure details:     Post-procedure:  Dressing applied and line sutured    Assessment:  Blood return through all ports, no pneumothorax on x-ray and placement verified by x-ray    Patient tolerance of procedure:   Tolerated well, no immediate complications

## 2023-09-14 NOTE — RESPIRATORY THERAPY NOTE
RT Ventilator Management Note  Bailey Ronny 68 y.o. female MRN: [id number]  Unit/Bed#: -01 Encounter: [id number]      Daily Screen    No data found in the last 10 encounters. Physical Exam:        Pt received on HFNC 60L @ 100%   -pt initially required bipap on prior shift for WON/decomensation. Pt returned to bipap at starrt on shift due to increased WOB secsondary to severe metabolic acidosis. Pt placed on bipap 16/8 @ 100% - pt with continuous tachypnea. Pt transported yo new ICU room to accommodate CVVH. ABG shows metabolic acidosis with hypoxia. SPO2 90%. Pt is currently Level 3.  Maintain bipap with interventions as required

## 2023-09-14 NOTE — PLAN OF CARE
Problem: MOBILITY - ADULT  Goal: Maintain or return to baseline ADL function  Description: INTERVENTIONS:  -  Assess patient's ability to carry out ADLs; assess patient's baseline for ADL function and identify physical deficits which impact ability to perform ADLs (bathing, care of mouth/teeth, toileting, grooming, dressing, etc.)  - Assess/evaluate cause of self-care deficits   - Assess range of motion  - Assess patient's mobility; develop plan if impaired  - Assess patient's need for assistive devices and provide as appropriate  - Encourage maximum independence but intervene and supervise when necessary  - Involve family in performance of ADLs  - Assess for home care needs following discharge   - Consider OT consult to assist with ADL evaluation and planning for discharge  - Provide patient education as appropriate  Outcome: Not Progressing  Goal: Maintains/Returns to pre admission functional level  Description: INTERVENTIONS:  - Perform BMAT or MOVE assessment daily.   - Set and communicate daily mobility goal to care team and patient/family/caregiver. - Collaborate with rehabilitation services on mobility goals if consulted  - Perform Range of Motion 2 times a day. - Reposition patient every 2 hours.   - Dangle patient 2 times a day  - Stand patient 2 times a day  - Ambulate patient 2 times a day  - Out of bed to chair 2 times a day   - Out of bed for meals 2 times a day  - Out of bed for toileting  - Record patient progress and toleration of activity level   Outcome: Not Progressing     Problem: Prexisting or High Potential for Compromised Skin Integrity  Goal: Skin integrity is maintained or improved  Description: INTERVENTIONS:  - Identify patients at risk for skin breakdown  - Assess and monitor skin integrity  - Assess and monitor nutrition and hydration status  - Monitor labs   - Assess for incontinence   - Turn and reposition patient  - Assist with mobility/ambulation  - Relieve pressure over bony prominences  - Avoid friction and shearing  - Provide appropriate hygiene as needed including keeping skin clean and dry  - Evaluate need for skin moisturizer/barrier cream  - Collaborate with interdisciplinary team   - Patient/family teaching  - Consider wound care consult   Outcome: Not Progressing     Problem: PAIN - ADULT  Goal: Verbalizes/displays adequate comfort level or baseline comfort level  Description: Interventions:  - Encourage patient to monitor pain and request assistance  - Assess pain using appropriate pain scale  - Administer analgesics based on type and severity of pain and evaluate response  - Implement non-pharmacological measures as appropriate and evaluate response  - Consider cultural and social influences on pain and pain management  - Notify physician/advanced practitioner if interventions unsuccessful or patient reports new pain  Outcome: Progressing     Problem: INFECTION - ADULT  Goal: Absence of fever/infection during neutropenic period  Description: INTERVENTIONS:  - Monitor WBC    Outcome: Progressing     Problem: SAFETY ADULT  Goal: Maintain or return to baseline ADL function  Description: INTERVENTIONS:  -  Assess patient's ability to carry out ADLs; assess patient's baseline for ADL function and identify physical deficits which impact ability to perform ADLs (bathing, care of mouth/teeth, toileting, grooming, dressing, etc.)  - Assess/evaluate cause of self-care deficits   - Assess range of motion  - Assess patient's mobility; develop plan if impaired  - Assess patient's need for assistive devices and provide as appropriate  - Encourage maximum independence but intervene and supervise when necessary  - Involve family in performance of ADLs  - Assess for home care needs following discharge   - Consider OT consult to assist with ADL evaluation and planning for discharge  - Provide patient education as appropriate  Outcome: Not Progressing  Goal: Maintains/Returns to pre admission functional level  Description: INTERVENTIONS:  - Perform BMAT or MOVE assessment daily.   - Set and communicate daily mobility goal to care team and patient/family/caregiver. - Collaborate with rehabilitation services on mobility goals if consulted  - Perform Range of Motion 2 times a day. - Reposition patient every 2 hours.   - Dangle patient 2 times a day  - Stand patient 2 times a day  - Ambulate patient 2 times a day  - Out of bed to chair 2 times a day   - Out of bed for meals 2 times a day  - Out of bed for toileting  - Record patient progress and toleration of activity level   Outcome: Not Progressing

## 2023-09-14 NOTE — QUICK NOTE
Spoke with patient's son, Jose Martin. He was given a clinical update. We discussed her progress on dialysis and concerned of her high risk of mortality given the severity of her medical conditions. He is understanding. He stated "That does not really put me in a good place but, I understand. She would never want life support." He will call her brother in [facility] and update him on the situation as he expressed wishes to fly in if she was decompensating. Jose Martin is aware that she is critically ill and her condition can worsen are any moment and that could result in sudden death. All questions were answered to his satisfaction. He will be down a bit laer to see hoe this are going. There were no further questions at this time.      HOLLY Gomez

## 2023-09-14 NOTE — ASSESSMENT & PLAN NOTE
· Baseline: 0.82   · Cr. On admission: 2.05   · Initially given IVF fluids when suspected pre-renal however that worsened respiratory status. · Clinically volume overload   · Worsening Cr. And anuria     Plan:   · Nephrology consulted   · Octreotide and bicarb gtt started   · Concern for hepatorenal syndrome as cause for worsening KANDACE   · Bicarb gtt started   · Trend Cr. · Trend BMP q 6 hours   · Monitor I/O   · Daily Weight   · started CRRT for volume removal/ metabolic acidosis in the setting of anuric renal failure.   · Electrolytes stable

## 2023-09-14 NOTE — ASSESSMENT & PLAN NOTE
· Secondary to liver cirrhosis d/t HERNANDEZ   · CT Abdomen: Large vL ascites. Extensive bilateral patchy airspace consolidation in the mid and lower lung zones. B/L Flank edema   · Concern for SBP     Plan:   · Discontinue diuresis in the setting of worsening Cr. · Nephrology consulted   · Albumin and Octreotide started.    · IR for paracentesis - 3.5 liters removed cultures sent

## 2023-09-14 NOTE — PROGRESS NOTES
NEPHROLOGY PROGRESS NOTE    Erik Query 68 y.o. female MRN: [id number]  Unit/Bed#: -01 Encounter: [id number]  Reason for Consult: Acute renal failure    The patient had clinical deterioration over yesterday and into yesterday evening with worsening respiratory status hypotension requiring pressor support she has been transferred to the intensive care unit. She is now been placed on CRRT and also on pressors. On noninvasive ventilation and is tachypneic. ASSESSMENT/PLAN:  1. Renal    The patient had acute renal failure with oliguria and initially urine sodium was very low suggesting hepatorenal syndrome. Patient's clinical status even worsened further she is now hypotensive requiring pressor support. Certainly she is at risk for going into ATN. Regardless of this given the metabolic acidosis renal failure patient was initiated on CRRT. The patient remains oliguric. Bicarbonate is improved. Will continue with CRRT but make adjustments. Will now run CVVH at 2000 cc/h replacement fluid 4 potassium/3 calcium. UF -100 cc/h as tolerated    We will continue with lab draws and electrolyte replacement protocols as ordered. Continue with supportive care per ICU. 2.  Acid-base    From the ABG earlier this morning it shows metabolic acidosis and respiratory acidosis. ICU team controlling respiratory support patient is a DO NOT INTUBATE so trying to manage with noninvasive ventilation. SUBJECTIVE:  ROS    Unable to obtain review of system from patient given her current clinical state. OBJECTIVE:  Current Weight: Weight - Scale: 114 kg (251 lb 5.2 oz)  Vitals:Temp (24hrs), Av.1 °F (36.2 °C), Min:96.3 °F (35.7 °C), Max:97.8 °F (36.6 °C)  Current: Temperature: (!) 97.3 °F (36.3 °C)   Blood pressure 134/59, pulse 98, temperature (!) 97.3 °F (36.3 °C), temperature source Axillary, resp.  rate (!) 36, height 5' 7" (1.702 m), weight 114 kg (251 lb 5.2 oz), SpO2 91 %. , Body mass index is 39.36 kg/m². Intake/Output Summary (Last 24 hours) at 9/14/2023 0922  Last data filed at 9/14/2023 0900  Gross per 24 hour   Intake 2501.7 ml   Output 3543 ml   Net -1041.3 ml       Physical Exam: /59   Pulse 98   Temp (!) 97.3 °F (36.3 °C) (Axillary)   Resp (!) 36   Ht 5' 7" (1.702 m)   Wt 114 kg (251 lb 5.2 oz)   SpO2 91%   BMI 39.36 kg/m²   Physical Exam  Constitutional:       Appearance: She is ill-appearing. Comments: Not alert occasionally looks around with eyes open and then goes back to sleep. Patient respiratory rate elevated. HENT:      Mouth/Throat:      Comments: On noninvasive ventilation  Eyes:      General: No scleral icterus. Cardiovascular:      Rate and Rhythm: Normal rate and regular rhythm. Heart sounds: No friction rub. No gallop. Comments: Positive edema. Pulmonary:      Effort: Respiratory distress present. Breath sounds: Rhonchi present. No wheezing. Comments: On noninvasive ventilation and tachypneic. Abdominal:      General: There is no distension. Palpations: Abdomen is soft. Comments: Unable to appreciate bowel sounds. Neurological:      Comments: Lethargic. Psychiatric:      Comments: Lethargic.          Medications:    Current Facility-Administered Medications:   •  albumin human (FLEXBUMIN) 25 % injection 25 g, 25 g, Intravenous, TID, HOLLY Varma, Last Rate: 0 mL/hr at 09/13/23 1952, 25 g at 09/14/23 0846  •  chlorhexidine (PERIDEX) 0.12 % oral rinse 15 mL, 15 mL, Mouth/Throat, Q12H L[facility]EN, HOLLY Varma  •  epoprostenol (VELETRI) 30,000 ng/mL in sodium chloride 0.9 % 50 mL inhalation solution, 6.25-50 ng/kg/min (Ideal), Inhalation, Titrated, Johnny Pizano PA-C, Last Rate: 6.2 mL/hr at 09/14/23 0802, 50 ng/kg/min at 09/14/23 0802  •  heparin (porcine) subcutaneous injection 5,000 Units, 5,000 Units, Subcutaneous, Q8H [facility], HOLLY Burton, 5,000 Units at 09/14/23 5591  •  insulin lispro (HumaLOG) 100 units/mL subcutaneous injection 2-12 Units, 2-12 Units, Subcutaneous, Q6H, 2 Units at 09/14/23 0812 **AND** Fingerstick Glucose (POCT), , , Q6H, HOLLY Varma  •  midodrine (PROAMATINE) tablet 5 mg, 5 mg, Oral, TID AC, HOLLY Varma  •  NOREPINEPHRINE 4 MG  ML NSS (CMPD ORDER) infusion, 1-30 mcg/min, Intravenous, Titrated, Johnny Pizano PA-C, Last Rate: 11.3 mL/hr at 09/14/23 0910, 3 mcg/min at 09/14/23 0910  •  NxStage K 2/Ca 3 dialysis solution (RFP-400) 20,000 mL, 20,000 mL, Dialysis, Continuous, Tatyana Torres, DO, 20,000 mL at 09/13/23 2233  •  octreotide (SandoSTATIN) injection 100 mcg, 100 mcg, Intravenous, Q8H [facility], HOLLY Varma, 100 mcg at 09/14/23 0527  •  ondansetron (ZOFRAN-ODT) dispersible tablet 4 mg, 4 mg, Oral, Q8H PRN, HOLLY Samayoa  •  piperacillin-tazobactam (ZOSYN) 3.375 g in sodium chloride 0.9 % 100 mL IVPB, 3.375 g, Intravenous, Q6H, HOLLY Varma, Stopped at 09/14/23 0530  •  sodium bicarbonate 150 mEq in dextrose 5 % 1,000 mL infusion, 50 mL/hr, Intravenous, Continuous, HOLLY Varma, Last Rate: 50 mL/hr at 09/14/23 0822, 50 mL/hr at 09/14/23 5061  •  [START ON 9/15/2023] vancomycin (VANCOCIN) 1,250 mg in sodium chloride 0.9 % 250 mL IVPB, 15 mg/kg (Adjusted), Intravenous, A97J, Jamaica Kennedy MD  •  vancomycin (VANCOCIN) 2,000 mg in sodium chloride 0.9 % 500 mL IVPB, 25 mg/kg (Adjusted), Intravenous, Once, [employer], CRNP, Last Rate: 250 mL/hr at 09/14/23 0920, 2,000 mg at 09/14/23 0920    Laboratory Results:  Lab Results   Component Value Date    WBC 22.70 (H) 09/14/2023    HGB 11.7 09/14/2023    HCT 37.0 09/14/2023     (H) 09/14/2023     (L) 09/14/2023     Lab Results   Component Value Date    SODIUM 143 09/14/2023    K 4.0 09/14/2023     (H) 09/14/2023    CO2 18 (L) 09/14/2023    BUN 74 (H) 09/14/2023    CREATININE 2.82 (H) 09/14/2023 GLUC 162 (H) 09/14/2023    CALCIUM 8.7 09/14/2023     Lab Results   Component Value Date    CALCIUM 8.7 09/14/2023    PHOS 5.4 (H) 09/14/2023     No results found for: "LABPROT"

## 2023-09-14 NOTE — PROGRESS NOTES
[address] [facility]  Interval Progress Note: Critical Care  Name: Thomas Palm  MRN: [id number]  Unit/Bed#: -01 I Date of Admission: 9/12/2023   Date of Service: 9/13/2023 I Hospital Day: 1    Interval Events:    Patient is 78-year-old female was transferred to the critical care service due to decompensation in the a.m. had been on ceftriaxone for possible SBP versus PNA, and biotics were broadened to Zosyn  Chest x-ray showed worsening bilateral opacities with possible pneumonia versus worsening pulmonary edema. Patient has a history of Hurshel Kinga with liver cirrhosis, paracentesis Done yesterday for 3.5 L. Was started by nephrology on albumin and octreotide and midodrine. Unable to give midodrine patient requiring BiPAP and high flow. When I evaluated patient was on 50 L 80% highflow satting at 90%, patient had a significant work of breathing with breathing in the 40, respiratory system trying to compensate for severe metabolic acidosis. Patient has not been making any urine, now dark brown to bloody urine 10-20 ml. Discussed further treatment and placed dialysis catheter to start CVVH, also placed A-line with initiation of Levophed for hypotension.      Contacted nephrology     VBG from the A-line shows a pH of 7.23, PCO2 of 46, PO2 of 38, CO2 of 18.6 with a base excess of -9 67.9%-this is on BiPAP 16 over 8 80 %   Patient also received 2 A of bicarb 50  ABG shows 7.28 PCO2 of 30 PO2 of 70 bicarb of 13.9 base excess of -11.6 with O2 sats of 91 this was before the VBG and before 1 amp of bicarb  Continue to monitor labs closely  Start CVVH    Contacted patient's son about dialysis start and change to room 307     Pertinent New Data:   blood pressure, pulse, temperature, respirations and pulse oximetry  Arterial Line  Reyna /45  Arterial Line BP  Min: 112/39  Max: 121/45   MAP 67 mmHg  Arterial Line MAP (mmHg)  Min: 59 mmHg  Max: 67 mmHg     Physical Exam  Eyes:      Pupils: Pupils are equal, round, and reactive to light. Skin:     Coloration: Skin is pale. Comments: Patient has multiple areas on her arms of ecchymosis from IV and bleeding underneath the skin   HENT:      Head: Normocephalic and atraumatic. Neck:     Vascular: JVD present. Cardiovascular:      Rate and Rhythm: Normal rate and regular rhythm. Pulses: Normal pulses. Musculoskeletal:      Right lower leg: 3+ Edema present. Left lower leg: 3+ Edema present. Abdominal:      Palpations: Abdomen is soft. Comments: Patient has of left abdominal bandaged from the paracentesis, has multiple black and blue marks on the area off heparin shot   Constitutional:       Appearance: She is morbidly obese. She is ill-appearing, toxic-appearing and diaphoretic. Interventions: Face mask in place. Pulmonary:      Effort: Tachypnea, accessory muscle usage and accessory muscle usage present. Breath sounds: Examination of the right-middle field reveals decreased breath sounds. Examination of the right-lower field reveals decreased breath sounds. Examination of the left-lower field reveals decreased breath sounds. Decreased breath sounds present. Comments: Some mostly coarse throughout  Neurological:      Mental Status: She is lethargic, disoriented, confused and CAM ICU positive . GCS: GCS eye subscore is 2. GCS verbal subscore is 2. GCS motor subscore is 5. Genitourinary/Anorectal:  FoleyVitals and nursing note reviewed.          CBC:   Lab Results   Component Value Date    WBC 16.65 (H) 09/13/2023    HGB 12.3 09/13/2023    HCT 40.5 09/13/2023     (H) 09/13/2023    PLT 63 (L) 09/13/2023    RBC 3.88 09/13/2023    MCH 31.7 09/13/2023    MCHC 30.4 (L) 09/13/2023    RDW 16.9 (H) 09/13/2023    MPV 9.6 09/13/2023   , CMP:   Lab Results   Component Value Date    SODIUM 144 09/13/2023    K 4.4 09/13/2023     (H) 09/13/2023    CO2 16 (L) 09/13/2023    BUN 85 (H) 09/13/2023    CREATININE 2.99 (H) 09/13/2023    CALCIUM 8.5 09/13/2023    AST 88 (H) 09/13/2023    ALT 44 09/13/2023    ALKPHOS 105 (H) 09/13/2023    EGFR 15 09/13/2023   , ABG:   Lab Results   Component Value Date    PHART 7.277 (L) 09/13/2023    ORT0QBP 30.4 (L) 09/13/2023    PO2ART 70.1 (L) 09/13/2023    NWJ4HHX 13.9 (L) 09/13/2023    BEART -11.6 09/13/2023    SOURCE Line, Arterial 09/13/2023   , PT/INR:   Lab Results   Component Value Date    INR 1.60 (H) 09/13/2023   , Troponin: No results found for: "TROPONINI", Magnesium: No components found for: "MAG", Phosphorous:   Lab Results   Component Value Date    PHOS 5.9 (H) 09/13/2023     chest xray, procedure reports and [facility] have personally reviewed pertinent reports. Assessment and Plan  · Diagnosis: Acute hypoxic and hypercapnic respiratory failure, hepatorenal syndrome, severe metabolic acidosis due to acute renal failure  · Plan: Start CVVH attempted to pull of 50 mils per hour  · Electrolyte replacement per protocol  · Attempt to correct metabolic acidosis with CVVH and bicarb drip  · Increase bicarb drip to 100/h when CVVH  · Continue labs every 6 hours  · Continue patient on BiPAP for now  · Levophed for hypotension  · A-line is placed      Billing Level:  During this visit, Critical care services were medically necessary as this patient had a high probability of imminent or life-threatening deterioration due to acute encephalopathy, shock, acute congestive heart failure, acute respiratory failure, Renal failure and acidosis , required my direct attention, intervention, and personal management to implement the following: EKG interpretation, CXR interpretation , directing of titration of vasoactive drugs, fluid management, bedside management, test review, records review, case discussed with consultants , direct patient care and documentation of findings.     I have personally provided 50 minutes on 09/13/23 of critical care time, exclusive of procedures, teaching, family meetings, and any prior time recorded by providers other than myself.     SIGNATURE: Johnny Pizano PA-C

## 2023-09-14 NOTE — RESPIRATORY THERAPY NOTE
RT Ventilator Management Note  Duc Oden 68 y.o. female MRN: [id number]  Unit/Bed#: -01 Encounter: [id number]      Daily Screen    No data found in the last 10 encounters. Physical Exam:   Respiratory Pattern: (P) Dyspnea at rest, Accessory muscle use, Tachypneic      Resp Comments: pt has remained on BIPAP throughout the day.  remains tachypneic. veletri started this am.

## 2023-09-14 NOTE — PROGRESS NOTES
The patient’s standard-infusion Piperacillin-Tazobactam / Zosyn (infused over 30-60 minutes) has been converted to extended-infusion (infused over 4 hours) per [facility], [facility] Extended-Infusion Piperacillin-Tazobactam Protocol for Adults as approved by the Pharmacy and Therapeutics Committee (accessible here on MyNET).      The patient met ALL eligible criteria:    Age >= 25years old   Critical Care patient    And did NOT have ANY exclusions:     Emergency Department or Operating Room patient  Drug incompatibilities that could NOT be avoided with timing or separate line administration    The following are reminders for Nursing regarding administration:  Infuse the first dose of Zosyn over 30min as a load (if new start), and then all subsequent doses will be given as an extended-infusion over 4 hours (see dosing below)  Use primary tubing as an intermittent infusion; change out primary tubing every 24 hours   Ensure full dose of the medication is given at the appropriate rate  Most incompatible drugs can be scheduled during times when the Zosyn is not being infused; however, if one requires administration during the same time, a separate site or lumen MUST be used  If access is limited and an incompatible medication urgently needs to be given, the Zosyn extended-infusion can be held for up to 30min (remember to flush line before/after)  Extended-infusion Zosyn does NOT require special timing around hemodialysis (it can even be given simultaneously)  If a patient needs an urgent MRI while Zosyn is infusing and there is not a MRI-compatible pump available for use, finish the infusion over the traditional length (30min) and ask Pharmacy to reschedule the next doses so that they start a few hours earlier  Pharmacy will assist nursing in troubleshooting other administration issues as they arise  Dosing for Piperacillin-Tazobactam  CrCl (mL/min) Traditional Dosing Extended-Infusion Dosing #   CrCl > 40 High-Dose  CrCl > 40 Low-Dose 4.5g Q6H (over 30min)  3.375g IV Q6H (over 30min) 3.375g IV Q8H (over 4hr)*    *1st dose loaded over 30min, then start extended-infusion dosing 4hr later   CrCl 20-40 High-Dose  CrCl 20-40 Low-Dose 3.375g IV Q6H (over 30min)  2.25g IV Q6H (over 30min)    CrCl < 20 High-Dose  CrCl < 20 Low-Dose 2.25g IV Q6H (over 30min)  2.25g IV Q8H (over 30min) 3.375g IV Q12H (over 4hr)*    *1st dose loaded over 30min, then start extended-infusion dosing 6hr later   Hemo/Peritoneal Dialysis High-Dose  Hemo/Peritoneal Dialysis Low-Dose 2.25g IV Q6H (over 30min)  2.25g IV Q8H (over 30min)    CVVH/D High-Dose  CVVH/D Low-Dose 3.375g IV Q6H (over 30min)  2.25 IV Q6H (over 30min) 3.375g IV Q8H (over 4hr)*    *1st dose loaded over 30min, then start extended-infusion dosing 4hr later   # = Use 4.5g dosing (same interval) if morbidly obese (BMI ?40)    Please call the Pharmacy with any questions or concerns.

## 2023-09-14 NOTE — ASSESSMENT & PLAN NOTE
· Unclear etiololgy   · DDX: PNA vs SBP   · WBC: 13.5 - 16.5  · LA: 1.9   · RR:32//Temp: 97.3     Plan:   · BCx2 Pend   · Paracentesis 3.5 liters cultures are pending   · Broadened to Zoysn from Ceftriaxone   · No currently hypotensive or LA >4 - No evidence of shock state. · Trend fever and WBC   · Support respiratory effort as mentioned above.   · Procal daily   · UA: negative

## 2023-09-14 NOTE — ASSESSMENT & PLAN NOTE
· Worsening gap acidosis  · Secondary to KANDACE   · AB/BE:-18  · CO2: 12     Plan:  · Initiate bicarb drip  · Trend BMPs every 6  · Nephrology following  · Placed hemodialysis catheter  · Started CRRT  · Contacted on-call nephrologist

## 2023-09-14 NOTE — ASSESSMENT & PLAN NOTE
· DDX: Volume overload versus pneumonia  · Worsening hypoxia overnight since admission. · Increased work of breathing with abdominal muscle use  · ABG revealed metabolic acidosis  · CT Chest: Extensive bilateral patchy airspace consolidation in the mid and lower lung zones as detailed above. Correlate with clinical findings for disseminated infectious etiology versus edema/ARDS. Large volume ascites. Findings of cirrhosis and portal hypertension as described. Bilateral flank edema. Plan:  · Antibiotics broadened to Zosyn  · Consider additional vancomycin  · Repeat chest x-ray revealed worsening bilateral opacifications unclear with worsening pneumonia or worsening pulmonary edema  · Placed on BiPAP for work of breathing  · BiPAP was initiated 16/8 at 80% for hypoxia - miss match between ABG on Po2 on monitor increased to 100 %   · Patient was on high flow 90% and 50 L did not tolerate had to be placed on BiPAP desatted into the 80s  · Wean oxygen as able.

## 2023-09-14 NOTE — ASSESSMENT & PLAN NOTE
· Known liver cirrhosis d/t HERNANDEZ   · CT abdomen with concern for large vL ascites.    · Worsening mentation and respiratory status  · MELD: 19     Plan:   · Consult to GI and Nephrology   · Albumin q 6   · Octreotide IV   · On CRRT  · S/p para 3.5 liters removed

## 2023-09-14 NOTE — PROGRESS NOTES
Mike Sanchez is a 68 y.o. female who is currently ordered Vancomycin IV with management by the Pharmacy Consult service. Relevant clinical data and objective / subjective history reviewed. Vancomycin Assessment:  Indication and Goal AUC/Trough: Bacteremia (goal -600, trough >10); Pneumonia (goal -600, trough >10), Bacteremia/Pneumonia CRRT(CVVH) (goal trough > or = 10 but <20)  Clinical Status: worsening  Micro:     Renal Function:  SCr: 2.82 mg/dL  CrCl: 23.5 mL/min  Renal replacement: CVVH    Days of Therapy: 1  Current Dose: 2000mg iv once  Vancomycin Plan:  New Dosinmg iv q24h    Next Level: 23 0700  Renal Function Monitoring: Daily BMP and UOP  Pharmacy will continue to follow closely for s/sx of nephrotoxicity, infusion reactions and appropriateness of therapy. BMP and CBC will be ordered per protocol. We will continue to follow the patient’s culture results and clinical progress daily.     Rashard Torres, Pharmacist

## 2023-09-15 PROBLEM — R65.21 SEPTIC SHOCK (HCC): Status: ACTIVE | Noted: 2023-01-01

## 2023-09-15 NOTE — PROGRESS NOTES
Philomena Guevara is a 68 y.o. female who is currently ordered Vancomycin IV with management by the Pharmacy Consult service. Relevant clinical data and objective / subjective history reviewed. Vancomycin Assessment:  Indication and Goal AUC/Trough: Bacteremia (goal -600, trough >10); Pneumonia (goal -600, trough >10), Bacteremia/Pneumonia CRRT(CVVH) (goal trough > or = 10 but <20)  Clinical Status: worsening  Micro:     Renal Function:  SCr: 2.18 mg/dL  CrCl: 30.3 mL/min  Renal replacement: CVVH    Days of Therapy: 2  Current Dose: 1250mg iv q24h  Vancomycin Plan:    Next Level: 09/16/23 0700  Renal Function Monitoring: Daily BMP and UOP  Pharmacy will continue to follow closely for s/sx of nephrotoxicity, infusion reactions and appropriateness of therapy. BMP and CBC will be ordered per protocol. We will continue to follow the patient’s culture results and clinical progress daily.     Francisco Ramirez, Pharmacist

## 2023-09-15 NOTE — ASSESSMENT & PLAN NOTE
· Known liver cirrhosis d/t HERNANDEZ   · CT abdomen with concern for large vL ascites.    · Worsening mentation and respiratory status  · MELD: 19     Plan:   · Consult to GI and Nephrology   · Albumin q 6   · Octreotide IV   · On CRRT  · S/p para 3.5 liters removed - cultures negative on Zosyn / VAncomycin

## 2023-09-15 NOTE — SPEECH THERAPY NOTE
Speech Language/Pathology  Records reviewed. Pt not appropriate for treatment at this time due to BiPap. SLP will continue to follow as appropriate/able.     Lashanda Lord, [id number] [facility]-SLP  9/15/23

## 2023-09-15 NOTE — ASSESSMENT & PLAN NOTE
Known liver cirrhosis d/t HERNANDEZ   CT abdomen with concern for large vL ascites.    Worsening mentation and respiratory status  MELD: 19   S/p paracentesis 09/13; 3.5 L removed, pre-scott cx negative    · Consult to GI and Nephrology   · Iain albumin and octreotide   · On CRRT  · F/u paracentesis cx- remains on vanc/cefepime  · Made CC

## 2023-09-15 NOTE — ASSESSMENT & PLAN NOTE
In setting of PNA vs SBP, mixed etiology likely largely 2/2 decompensated cirrhosis/metabolic acidosis/RV dilation and sepsis  Blood cx-   Paracentesis 09/13 w/ negative cx thus far, however high risk for SBP  MRSA screen +  UA -  CT w/ ARDS vs multifocal PNA    · Continue vanc/zosyn D#4  · Levophed, vasopressin, and epi gtt; wean as tolerated for MAP > 65  · Added stress dose steroids 09/15  · Trend fever and WBC   · maxed out ob levophed / vasopressin and epi   · Made CC at 1930

## 2023-09-15 NOTE — NURSING NOTE
Pt's MAPs were running <65 mmHg, levophed and epinephrine gtt's at max titratable dose, running CRRT even. Alejandra Guzman PA-C called to bedside, while getting an EKG pt went into a rapid a.fib, also had a run of vtach. CRRT stopped and blood returned to patient. Family called and en route.

## 2023-09-15 NOTE — RESPIRATORY THERAPY NOTE
RT Protocol Note  Wagner Benton 68 y.o. female MRN: [id number]  Unit/Bed#: -01 Encounter: [id number]    Assessment    Principal Problem:    KANDACE (acute kidney injury) ([address])  Active Problems:    Class 3 severe obesity in adult [facility])    Decompensated hepatic cirrhosis ([address])    Metabolic encephalopathy    Anxiety    Type 2 diabetes mellitus, without long-term current use of insulin (HCC)    Asthma    Hypertension    Ascites    Hepatorenal syndrome ([address])    Acute metabolic acidosis    Acute respiratory failure with hypoxia (HCC)    Septic shock (HCC)    Oliguria and anuria    Encounter for continuous venovenous hemodiafiltration (CVVHD) (HCC)      Home Pulmonary Medications:         Past Medical History:   Diagnosis Date    Diabetes mellitus ([address])      Social History     Socioeconomic History    Marital status: /Civil Union     Spouse name: None    Number of children: None    Years of education: None    Highest education level: None   Occupational History    None   Tobacco Use    Smoking status: Never    Smokeless tobacco: Never   Vaping Use    Vaping Use: Never used   Substance and Sexual Activity    Alcohol use: Not Currently    Drug use: Not Currently    Sexual activity: Not Currently   Other Topics Concern    None   Social History Narrative    None     Social Determinants of Health     Financial Resource Strain: Not on file   Food Insecurity: No Food Insecurity (9/13/2023)    Hunger Vital Sign     Worried About Running Out of Food in the Last Year: Never true     Ran Out of Food in the Last Year: Never true   Transportation Needs: No Transportation Needs (9/13/2023)    PRAPARE - Transportation     Lack of Transportation (Medical): No     Lack of Transportation (Non-Medical):  No   Physical Activity: Not on file   Stress: Not on file   Social Connections: Not on file   Intimate Partner Violence: Not on file   Housing Stability: Low Risk  (9/13/2023)    Housing Stability Vital Sign     Unable to Pay for Housing in the Last Year: No     Number of Places Lived in the Last Year: 2     Unstable Housing in the Last Year: No       Subjective         Objective    Physical Exam:        Vitals:  Blood pressure 140/93, pulse 83, temperature 97.5 °F (36.4 °C), temperature source Axillary, resp. rate (!) 41, height 5' 7" (1.702 m), weight 113 kg (249 lb 9 oz), SpO2 94 %. Results from last 7 days   Lab Units 09/15/23  0412   PH ART  7.278*   PCO2 ART mm Hg 42.5   PO2 ART mm Hg 86.7   HCO3 ART mmol/L 19.4*   BASE EXC ART mmol/L -7.0   O2 CONTENT ART mL/dL 15.5*   O2 HGB, ARTERIAL % 95.2   ABG SOURCE  Line, Arterial   SOSA TEST  Yes       Imaging and other studies: I have personally reviewed pertinent reports. Plan             Resp Comments: (P) Veletri stopped per MD order. PIP 19, BiPAP 16/10/100%, BP 89/43, SpO2 96%.

## 2023-09-15 NOTE — ASSESSMENT & PLAN NOTE
Large vL ascites on CT scan     · Trend fever and WBC curve   · On vanc/zosyn D#4  · F/u paracentesis cx   · MRSA screen +

## 2023-09-15 NOTE — PROGRESS NOTES
NEPHROLOGY PROGRESS NOTE    Mike Sanchez 68 y.o. female MRN: [id number]  Unit/Bed#: -01 Encounter: [id number]  Reason for Consult: Acute renal failure    The patient remains critically ill on pressor support. On CVVH. Remains on noninvasive ventilation and is tachypneic and not responsive for me. ASSESSMENT/PLAN:  1. Renal    Patient has acute renal failure likely combination of hepatorenal syndrome potentially ATN. The patient is virtually anuric. She has been placed on CRRT with CVVH. Labs show potassium 4.6 improvement of metabolic acidosis with bicarbonate of 18. Continue CVVH with replacement fluid 4 potassium/3 calcium at 2000 cc/h. Labs and electrolyte replacement protocols ordered. UF running even now. 2.  Acid-base    ABG from 4 AM today shows metabolic and respiratory acidosis. On noninvasive ventilation. 3.  Pulmonary    Patient with pneumonia worse consolidation on x-ray. On noninvasive ventilation with respiratory distress. On broad-spectrum antibiotics. ICU team managing. 4.  Cirrhosis of the liver. SUBJECTIVE:  ROS    Unable to obtain review of system from patient given clinical state. She was on noninvasive ventilation. OBJECTIVE:  Current Weight: Weight - Scale: 113 kg (249 lb 9 oz)  Vitals:Temp (24hrs), Av.4 °F (36.3 °C), Min:96.7 °F (35.9 °C), Max:98.1 °F (36.7 °C)  Current: Temperature: 97.5 °F (36.4 °C)   Blood pressure 140/93, pulse 82, temperature 97.5 °F (36.4 °C), temperature source Axillary, resp. rate (!) 42, height 5' 7" (1.702 m), weight 113 kg (249 lb 9 oz), SpO2 92 %. , Body mass index is 39.09 kg/m².       Intake/Output Summary (Last 24 hours) at 9/15/2023 0913  Last data filed at 9/15/2023 0801  Gross per 24 hour   Intake 4068.71 ml   Output 7020 ml   Net -2951.29 ml       Physical Exam: /93   Pulse 82   Temp 97.5 °F (36.4 °C) (Axillary)   Resp (!) 42   Ht 5' 7" (1.702 m)   Wt 113 kg (249 lb 9 oz)   SpO2 92%   BMI 39.09 kg/m²   Physical Exam  Constitutional:       General: She is not in acute distress. Appearance: She is ill-appearing. Comments: Not alert. HENT:      Head: Normocephalic. Mouth/Throat:      Mouth: Mucous membranes are dry. Eyes:      General: No scleral icterus. Cardiovascular:      Rate and Rhythm: Normal rate and regular rhythm. Heart sounds: No friction rub. No gallop. Comments: Positive edema. Pulmonary:      Breath sounds: No wheezing or rales. Comments: Tachypneic on noninvasive ventilation. Effort abnormal.  Abdominal:      General: There is no distension. Palpations: Abdomen is soft. Comments: Unable to appreciate bowel sounds. Neurological:      Comments: Unable to assess given clinical state. Psychiatric:      Comments: Lethargic.          Medications:    Current Facility-Administered Medications:   •  albumin human (FLEXBUMIN) 25 % injection 25 g, 25 g, Intravenous, TID, Nila Shepherd MD, Last Rate: 0 mL/hr at 09/14/23 1350, 25 g at 09/15/23 0552  •  chlorhexidine (PERIDEX) 0.12 % oral rinse 15 mL, 15 mL, Mouth/Throat, Q12H LANDEN, HOLLY Varma, 15 mL at 09/15/23 5369  •  EPINEPHrine 5,000 mcg (STANDARD CONCENTRATION) IV in sodium chloride 0.9% 250 mL, 1-10 mcg/min, Intravenous, Titrated, Prudence Michaud PA-C, Held at 09/15/23 0017  •  epoprostenol (VELETRI) 30,000 ng/mL in sodium chloride 0.9 % 50 mL inhalation solution, 6.25-50 ng/kg/min (Ideal), Inhalation, Titrated, Johnny Pizano PA-C, Last Rate: 0.8 mL/hr at 09/15/23 0421, 6.494 ng/kg/min at 09/15/23 0421  •  glycerin-hypromellose- (ARTIFICIAL TEARS) ophthalmic solution 2 drop, 2 drop, Both Eyes, TID, HOLLY Varma, 2 drop at 09/15/23 0800  •  insulin lispro (HumaLOG) 100 units/mL subcutaneous injection 2-12 Units, 2-12 Units, Subcutaneous, Q6H, 2 Units at 09/15/23 0026 **AND** Fingerstick Glucose (POCT), , , Q6H, HOLLY Whitaker  • NOREPINEPHRINE 4 MG  ML NSS (CMPD ORDER) infusion, 1-30 mcg/min, Intravenous, Titrated, Johnny Pizano PA-C, Last Rate: 93.8 mL/hr at 09/15/23 0823, 25 mcg/min at 09/15/23 4411  •  NxStage K 4/Ca 3 dialysis solution (RFP-401) 20,000 mL, 20,000 mL, Dialysis, Continuous, Cintia David, Last Rate: 1,800 mL/hr at 09/15/23 0833, 20,000 mL at 09/15/23 4924  •  octreotide (SandoSTATIN) injection 100 mcg, 100 mcg, Intravenous, Q8H LANDEN, HOLLY Varma, 100 mcg at 09/15/23 0555  •  ondansetron (ZOFRAN-ODT) dispersible tablet 4 mg, 4 mg, Oral, Q8H PRN, HOLLY Varma  •  piperacillin-tazobactam (ZOSYN) 3.375 g in sodium chloride 0.9 % 100 mL IVPB (EXTENDED-INFUSION), 3.375 g, Intravenous, T7E, Erna Awad MD, 0.082 g at 09/15/23 0555  •  sodium bicarbonate 150 mEq in dextrose 5 % 1,000 mL infusion, 75 mL/hr, Intravenous, Continuous, Johnny Pizano PA-C, Last Rate: 75 mL/hr at 09/15/23 0600, 75 mL/hr at 09/15/23 0600  •  vancomycin (VANCOCIN) 1,250 mg in sodium chloride 0.9 % 250 mL IVPB, 15 mg/kg (Adjusted), Intravenous, H05B, Erna Awad MD, Last Rate: 166.7 mL/hr at 09/15/23 0801, 1,250 mg at 09/15/23 0801  •  vasopressin (PITRESSIN) 20 Units in sodium chloride 0.9 % 100 mL infusion, 0.04 Units/min, Intravenous, Continuous, Erna Awad MD, Last Rate: 12 mL/hr at 09/15/23 0230, 0.04 Units/min at 09/15/23 0230    Laboratory Results:  Lab Results   Component Value Date    WBC 15.73 (H) 09/15/2023    HGB 10.5 (L) 09/15/2023    HCT 33.0 (L) 09/15/2023     (H) 09/15/2023    PLT 41 (LL) 09/15/2023     Lab Results   Component Value Date    SODIUM 139 09/15/2023    K 4.6 09/15/2023     09/15/2023    CO2 18 (L) 09/15/2023    BUN 43 (H) 09/15/2023    CREATININE 2.18 (H) 09/15/2023    GLUC 144 (H) 09/15/2023    CALCIUM 9.3 09/15/2023     Lab Results   Component Value Date    CALCIUM 9.3 09/15/2023    PHOS 3.4 09/15/2023     No results found for: "LABPROT"

## 2023-09-15 NOTE — ASSESSMENT & PLAN NOTE
Lab Results   Component Value Date    HGBA1C 6.4 (H) 02/20/2021       Recent Labs     09/14/23  0024 09/14/23  1210 09/14/23  1758 09/14/23  2353   POCGLU 168* 155* 161* 168*       Blood Sugar Average: Last 72 hrs:  (P) [id number]     · Continue q 6 accuchecks   · SSI

## 2023-09-15 NOTE — ASSESSMENT & PLAN NOTE
· Large vL ascites on CT scan   · Worsening WBCs    Plan:   · Trend fever and WBC curve   · ABX broadened   · Pending paracentesis with cultures - negative so far   · MRSA is positive

## 2023-09-15 NOTE — ASSESSMENT & PLAN NOTE
· Worsening gap acidosis  · Secondary to KANDACE   · AB/BE:-18  · CO2: 12     Plan:  · Initiate bicarb drip  · Trend BMPs every 6  · Nephrology following  · Placed hemodialysis catheter  · Started CRRT acidosis is slowly improving   · Contacted on-call nephrologist

## 2023-09-15 NOTE — ASSESSMENT & PLAN NOTE
· Secondary to liver cirrhosis d/t HERNANDEZ   · CT Abdomen: Large vL ascites. Extensive bilateral patchy airspace consolidation in the mid and lower lung zones. B/L Flank edema   · Concern for SBP     Plan:   · Discontinue diuresis in the setting of worsening Cr. · Nephrology consulted   · Albumin and Octreotide started.    · IR for paracentesis - 3.5 liters removed cultures sent - negative so far

## 2023-09-15 NOTE — ASSESSMENT & PLAN NOTE
Concern for hepatorenal syndrome in setting of HERNANDEZ cirrhosis, complicated by pre-renal state in setting of sepsis  Baseline 0.82;  Cr on admission 2.05   CRRT started 09/13  Clinically volume overload   Worsening Cr And anuria     · Nephrology consulted- CRRT stopped 09/15 for worsening shock/arrhythmias; no plan to re-initiate after additional GOC  · Made CC  · Continue octreotide and cryus albumin  · Bicarb gtt d/c  · BMP/lytes and VBG q6h  · Monitor I/O   · Daily Weight

## 2023-09-15 NOTE — ASSESSMENT & PLAN NOTE
Worsening confusion over the 2 weeks leading to admission  Likely secondary to elevated ammonia level and metabolic acidosis in the setting of sepsis.   CTH similar to prior    · Lactose d/c as ammonia normalized  · Trend ammonia level  · Neuro check Q2  · Made CC tim

## 2023-09-15 NOTE — CASE MANAGEMENT
Case Management Progress Note    Patient name Jl Whiteside  Location /-66 MRN [id number]  : 1950 Date 9/15/2023       LOS (days): 3  Geometric Mean LOS (GMLOS) (days): 4.70  Days to GMLOS:1.9        OBJECTIVE:        Current admission status: Inpatient  Preferred Pharmacy:   [facility], [address]  [address] [place] [address]  Phone: [phone] Fax: [phone]    Primary Care Provider: Shashank Tello DO    Primary Insurance: MEDICARE  Secondary Insurance: BLUE CROSS    Chart reviewed aware of medical management. Case was discussed in AM rounds in the ICU  Clinical information supporting hospitalization:   Remains on 2 pressors, bipap  Aware family member is planning on visiting from out of town. Provider to continue goals of care discussions. CM will continue to follow plan of care.

## 2023-09-15 NOTE — PLAN OF CARE
Problem: MOBILITY - ADULT  Goal: Maintain or return to baseline ADL function  Description: INTERVENTIONS:  -  Assess patient's ability to carry out ADLs; assess patient's baseline for ADL function and identify physical deficits which impact ability to perform ADLs (bathing, care of mouth/teeth, toileting, grooming, dressing, etc.)  - Assess/evaluate cause of self-care deficits   - Assess range of motion  - Assess patient's mobility; develop plan if impaired  - Assess patient's need for assistive devices and provide as appropriate  - Encourage maximum independence but intervene and supervise when necessary  - Involve family in performance of ADLs  - Assess for home care needs following discharge   - Consider OT consult to assist with ADL evaluation and planning for discharge  - Provide patient education as appropriate  Outcome: Not Progressing  Goal: Maintains/Returns to pre admission functional level  Description: INTERVENTIONS:  - Perform BMAT or MOVE assessment daily.   - Set and communicate daily mobility goal to care team and patient/family/caregiver. - Collaborate with rehabilitation services on mobility goals if consulted  - Perform Range of Motion 2 times a day. - Reposition patient every 2 hours.   - Dangle patient 2 times a day  - Stand patient 2 times a day  - Ambulate patient 2 times a day  - Out of bed to chair 2 times a day   - Out of bed for meals 2 times a day  - Out of bed for toileting  - Record patient progress and toleration of activity level   Outcome: Not Progressing     Problem: Prexisting or High Potential for Compromised Skin Integrity  Goal: Skin integrity is maintained or improved  Description: INTERVENTIONS:  - Identify patients at risk for skin breakdown  - Assess and monitor skin integrity  - Assess and monitor nutrition and hydration status  - Monitor labs   - Assess for incontinence   - Turn and reposition patient  - Assist with mobility/ambulation  - Relieve pressure over bony prominences  - Avoid friction and shearing  - Provide appropriate hygiene as needed including keeping skin clean and dry  - Evaluate need for skin moisturizer/barrier cream  - Collaborate with interdisciplinary team   - Patient/family teaching  - Consider wound care consult   Outcome: Not Progressing     Problem: PAIN - ADULT  Goal: Verbalizes/displays adequate comfort level or baseline comfort level  Description: Interventions:  - Encourage patient to monitor pain and request assistance  - Assess pain using appropriate pain scale  - Administer analgesics based on type and severity of pain and evaluate response  - Implement non-pharmacological measures as appropriate and evaluate response  - Consider cultural and social influences on pain and pain management  - Notify physician/advanced practitioner if interventions unsuccessful or patient reports new pain  Outcome: Not Progressing     Problem: INFECTION - ADULT  Goal: Absence or prevention of progression during hospitalization  Description: INTERVENTIONS:  - Assess and monitor for signs and symptoms of infection  - Monitor lab/diagnostic results  - Monitor all insertion sites, i.e. indwelling lines, tubes, and drains  - Monitor endotracheal if appropriate and nasal secretions for changes in amount and color  - [place] appropriate cooling/warming therapies per order  - Administer medications as ordered  - Instruct and encourage patient and family to use good hand hygiene technique  - Identify and instruct in appropriate isolation precautions for identified infection/condition  Outcome: Not Progressing  Goal: Absence of fever/infection during neutropenic period  Description: INTERVENTIONS:  - Monitor WBC    Outcome: Not Progressing     Problem: SAFETY ADULT  Goal: Maintain or return to baseline ADL function  Description: INTERVENTIONS:  -  Assess patient's ability to carry out ADLs; assess patient's baseline for ADL function and identify physical deficits which impact ability to perform ADLs (bathing, care of mouth/teeth, toileting, grooming, dressing, etc.)  - Assess/evaluate cause of self-care deficits   - Assess range of motion  - Assess patient's mobility; develop plan if impaired  - Assess patient's need for assistive devices and provide as appropriate  - Encourage maximum independence but intervene and supervise when necessary  - Involve family in performance of ADLs  - Assess for home care needs following discharge   - Consider OT consult to assist with ADL evaluation and planning for discharge  - Provide patient education as appropriate  Outcome: Not Progressing  Goal: Maintains/Returns to pre admission functional level  Description: INTERVENTIONS:  - Perform BMAT or MOVE assessment daily.   - Set and communicate daily mobility goal to care team and patient/family/caregiver. - Collaborate with rehabilitation services on mobility goals if consulted  - Perform Range of Motion 2 times a day. - Reposition patient every 2 hours.   - Dangle patient 2 times a day  - Stand patient 2 times a day  - Ambulate patient 2 times a day  - Out of bed to chair 2 times a day   - Out of bed for meals 2 times a day  - Out of bed for toileting  - Record patient progress and toleration of activity level   Outcome: Not Progressing  Goal: Patient will remain free of falls  Description: INTERVENTIONS:  - Educate patient/family on patient safety including physical limitations  - Instruct patient to call for assistance with activity   - Consult OT/PT to assist with strengthening/mobility   - Keep Call bell within reach  - Keep bed low and locked with side rails adjusted as appropriate  - Keep care items and personal belongings within reach  - Initiate and maintain comfort rounds  - Make Fall Risk Sign visible to staff  - Offer Toileting every 2 Hours, in advance of need  - Initiate/Maintain bed alarm  - Obtain necessary fall risk management equipment: walker, sit to stand, yellow socks    - Apply yellow socks and bracelet for high fall risk patients  - Consider moving patient to room near nurses station  Outcome: Not Progressing     Problem: DISCHARGE PLANNING  Goal: Discharge to home or other facility with appropriate resources  Description: INTERVENTIONS:  - Identify barriers to discharge w/patient and caregiver  - Arrange for needed discharge resources and transportation as appropriate  - Identify discharge learning needs (meds, wound care, etc.)  - Arrange for interpretive services to assist at discharge as needed  - Refer to Case Management Department for coordinating discharge planning if the patient needs post-hospital services based on physician/advanced practitioner order or complex needs related to functional status, cognitive ability, or social support system  Outcome: Not Progressing     Problem: Knowledge Deficit  Goal: Patient/family/caregiver demonstrates understanding of disease process, treatment plan, medications, and discharge instructions  Description: Complete learning assessment and assess knowledge base.   Interventions:  - Provide teaching at level of understanding  - Provide teaching via preferred learning methods  Outcome: Not Progressing     Problem: RESPIRATORY - ADULT  Goal: Achieves optimal ventilation and oxygenation  Description: INTERVENTIONS:  - Assess for changes in respiratory status  - Assess for changes in mentation and behavior  - Position to facilitate oxygenation and minimize respiratory effort  - Oxygen administered by appropriate delivery if ordered  - Initiate smoking cessation education as indicated  - Encourage broncho-pulmonary hygiene including cough, deep breathe, Incentive Spirometry  - Assess the need for suctioning and aspirate as needed  - Assess and instruct to report SOB or any respiratory difficulty  - Respiratory Therapy support as indicated  Outcome: Not Progressing     Problem: METABOLIC, FLUID AND ELECTROLYTES - ADULT  Goal: Electrolytes maintained within normal limits  Description: INTERVENTIONS:  - Monitor labs and assess patient for signs and symptoms of electrolyte imbalances  - Administer electrolyte replacement as ordered  - Monitor response to electrolyte replacements, including repeat lab results as appropriate  - Instruct patient on fluid and nutrition as appropriate  Outcome: Not Progressing  Goal: Fluid balance maintained  Description: INTERVENTIONS:  - Monitor labs   - Monitor I/O and WT  - Instruct patient on fluid and nutrition as appropriate  - Assess for signs & symptoms of volume excess or deficit  Outcome: Not Progressing  Goal: Glucose maintained within target range  Description: INTERVENTIONS:  - Monitor Blood Glucose as ordered  - Assess for signs and symptoms of hyperglycemia and hypoglycemia  - Administer ordered medications to maintain glucose within target range  - Assess nutritional intake and initiate nutrition service referral as needed  Outcome: Not Progressing     Problem: Nutrition/Hydration-ADULT  Goal: Nutrient/Hydration intake appropriate for improving, restoring or maintaining nutritional needs  Description: Monitor and assess patient's nutrition/hydration status for malnutrition. Collaborate with interdisciplinary team and initiate plan and interventions as ordered. Monitor patient's weight and dietary intake as ordered or per policy. Utilize nutrition screening tool and intervene as necessary. Determine patient's food preferences and provide high-protein, high-caloric foods as appropriate.      INTERVENTIONS:  - Monitor oral intake, urinary output, labs, and treatment plans  - Assess nutrition and hydration status and recommend course of action  - Evaluate amount of meals eaten  - Assist patient with eating if necessary   - Allow adequate time for meals  - Recommend/ encourage appropriate diets, oral nutritional supplements, and vitamin/mineral supplements  - Order, calculate, and assess calorie counts as needed  - Recommend, monitor, and adjust tube feedings and TPN/PPN based on assessed needs  - Assess need for intravenous fluids  - Provide specific nutrition/hydration education as appropriate  - Include patient/family/caregiver in decisions related to nutrition  Outcome: Not Progressing

## 2023-09-15 NOTE — ACP (ADVANCE CARE PLANNING)
Critical Care Advanced Care Planning Note  Ceasar Sam 68 y.o. female MRN: [id number]  Unit/Bed#: -01 Encounter: [id number]    Ceasar Sam is a 68 y.o. female requiring critical care evaluation and advanced care planning. The patient has chronic comorbidities, including but not limited to HERNANDEZ, anxiety, DM, HTN, which is now further complicated by the following acute conditions: Shock, KANDACE, hepatic failure, sepsis, and hypoxic respiratory failure. Due to the severity of the patient's acute condition and/or the extent of chronic conditions, additional conversations pertaining to advanced care planning were required. Today's discussion, which was held in a face-to-face meeting and over the phone, included  and son, and it was established that all stake holders understood the rationale for the advanced care planning. The patient was unable to participate in the discussion due to Altered mental status and BiPAP use. Summary of Discussion:  Called patient's family to bedside as patient with worsening shock and subsequently new onset A-fib with RVR and subsequently ventricular tachycardia requiring amiodarone, magnesium, calcium, and rinsing back CRRT. Patient's son and  clear that goal at this time is to keep patient alive to see her brother (who is in route from [place] and will be arriving this evening), at which point goal will be to transition to comfort measures given continued clinical worsening. Current plan is to maintain current level of care, but with no escalation of care; including no additional invasive procedures, no cardioversion, no additional vasoactive medications, and no resumption of CRRT. If patient were to further decompensate, will transition to comfort measures only at that time after notification of  and son.     Total time spent, 35 minutes between ACP planning and critical care time; including phone call and bedside discussion with spouse and son.      CODE STATUS: DNR/DNI - Level 3  POA:    POLST:        SIGNATURE: Cintia Juárez  DATE: September 15, 2023  TIME: 2:06 PM

## 2023-09-15 NOTE — ASSESSMENT & PLAN NOTE
· DDX: Volume overload versus pneumonia  · Worsening hypoxia overnight since admission. · Increased work of breathing with abdominal muscle use  · ABG revealed metabolic acidosis  · CT Chest: Extensive bilateral patchy airspace consolidation in the mid and lower lung zones as detailed above. Correlate with clinical findings for disseminated infectious etiology versus edema/ARDS. Large volume ascites. Findings of cirrhosis and portal hypertension as described. Bilateral flank edema.     Plan:  · Antibiotics broadened to Zosyn  · addedl vancomycin  · Repeat chest x-ray revealed worsening bilateral opacifications unclear with worsening pneumonia or worsening pulmonary edema  · Placed on BiPAP for work of breathing  · BiPAP was initiated 16/10 at 100% for hypoxia - miss match between ABG on Po2 on monitor increased to 100 %   · Had 24 hours of veletri

## 2023-09-15 NOTE — PROGRESS NOTES
[address] [facility]  Progress Note  Name: Cassie Thomas  MRN: [id number]  Unit/Bed#: -01 I Date of Admission: 2023   Date of Service: 9/15/2023 I Hospital Day: 3    Assessment/Plan   Sepsis ([address])  Assessment & Plan  · Unclear etiololgy   · DDX: PNA vs SBP   · WBC: 13.5 - 16.5  · LA: 1.9   · RR:32//Temp: 97.3     Plan:   · BCx2 Pend   · Paracentesis 3.5 liters cultures are negative so far   · Broadened to Zoysn from Ceftriaxone added vancomycin   · MRSA +   · Blood cultures - Neg so far   · Trend fever and WBC   · Support respiratory effort as mentioned above. · Procal daily   · UA: negative     Acute respiratory failure with hypoxia (HCC)  Assessment & Plan  · DDX: Volume overload versus pneumonia  · Worsening hypoxia overnight since admission. · Increased work of breathing with abdominal muscle use  · ABG revealed metabolic acidosis  · CT Chest: Extensive bilateral patchy airspace consolidation in the mid and lower lung zones as detailed above. Correlate with clinical findings for disseminated infectious etiology versus edema/ARDS. Large volume ascites. Findings of cirrhosis and portal hypertension as described. Bilateral flank edema.     Plan:  · Antibiotics broadened to Zosyn  · addedl vancomycin  · Repeat chest x-ray revealed worsening bilateral opacifications unclear with worsening pneumonia or worsening pulmonary edema  · Placed on BiPAP for work of breathing  · BiPAP was initiated 16/10 at 100% for hypoxia - miss match between ABG on Po2 on monitor increased to 100 %   · Had 24 hours of veletri       Risk for SBP (spontaneous bacterial peritonitis) (HCC)  Assessment & Plan  · Large vL ascites on CT scan   · Worsening WBCs    Plan:   · Trend fever and WBC curve   · ABX broadened   · Pending paracentesis with cultures - negative so far   · MRSA is positive     Acute metabolic acidosis  Assessment & Plan  · Worsening gap acidosis  · Secondary to KANDACE   · AB/BE:-18  · CO2: 12     Plan:  · Initiate bicarb drip  · Trend BMPs every 6  · Nephrology following  · Placed hemodialysis catheter  · Started CRRT acidosis is slowly improving   · Contacted on-call nephrologist    Hepatorenal syndrome [facility])  Assessment & Plan  · Risk for hepatorenal   · See plan as stated above   · On dialysis and levophed / vasopressin       Ascites  Assessment & Plan  · Secondary to liver cirrhosis d/t HERNANDEZ   · CT Abdomen: Large vL ascites. Extensive bilateral patchy airspace consolidation in the mid and lower lung zones. B/L Flank edema   · Concern for SBP     Plan:   · Discontinue diuresis in the setting of worsening Cr. · Nephrology consulted   · Albumin and Octreotide started. · IR for paracentesis - 3.5 liters removed cultures sent - negative so far     Hypertension  Assessment & Plan  · Holding antihypertensive agents     Asthma  Assessment & Plan  · No PFTs on file  · Nebs PRN   · No wheezing on exam     Type 2 diabetes mellitus, without long-term current use of insulin [facility])  Assessment & Plan  Lab Results   Component Value Date    HGBA1C 6.4 (H) 02/20/2021       Recent Labs     09/14/23  0024 09/14/23  1210 09/14/23  1758 09/14/23  2353   POCGLU 168* 155* 161* 168*       Blood Sugar Average: Last 72 hrs:  (P) [id number]     · Continue q 6 accuchecks   · SSI     Anxiety  Assessment & Plan  · Continue Zoloft when able to PO     Metabolic encephalopathy  Assessment & Plan  · Patient was slightly confused on admission however confusion has worsened. · Family states that she has been confused over the last 2 weeks at the nursing home. · Likely secondary to elevated ammonia level and metabolic acidosis in the setting of sepsis. · CT head: Negative for acute intracranial process. Reported prominent ventricles. Imaging seems about similar from previous scan.    · Would consider MRI of brain if decreased mentation continues     Plan:   · Administer lactulose rectally  · Trend ammonia level  · Neuro check Q2        Decompensated hepatic cirrhosis (HCC)  Assessment & Plan  · Known liver cirrhosis d/t HERNANDEZ   · CT abdomen with concern for large vL ascites. · Worsening mentation and respiratory status  · MELD: 19     Plan:   · Consult to GI and Nephrology   · Albumin q 6   · Octreotide IV   · On CRRT  · S/p para 3.5 liters removed - cultures negative on Zosyn / VAncomycin     Class 3 severe obesity in adult [facility])  Assessment & Plan  · Consult on lifestyle modifications when appropriate. * KANDACE (acute kidney injury) ([address])  Assessment & Plan  · Baseline: 0.82   · Cr. On admission: 2.05   · Initially given IVF fluids when suspected pre-renal however that worsened respiratory status. · Clinically volume overload   · Worsening Cr. And anuria     Plan:   · Nephrology consulted   · Octreotide and bicarb gtt started   · Concern for hepatorenal syndrome as cause for worsening KANDACE   · Bicarb gtt at 50 mls per hour   · Albumin 25 % q 6 hours  · Trend Cr. · Trend BMP q 6 hours   · Monitor I/O   · Daily Weight   · started CRRT for volume removal/ metabolic acidosis in the setting of anuric renal failure. · Electrolytes stable             ICU Core Measures     A: Assess, Prevent, and Manage Pain · Has pain been assessed? Yes  · Need for changes to pain regimen? No   B: Both SAT/SAT  · N/A   C: Choice of Sedation · RASS Goal: 0 Alert and Calm or N/A patient not on sedation  · Need for changes to sedation or analgesia regimen? NA   D: Delirium · CAM-ICU: Positive   E: Early Mobility  · Plan for early mobility? Yes   F: Family Engagement · Plan for family engagement today? Yes       Antibiotic Review: Awaiting culture results. Review of Invasive Devices:      Central access plan: HD cath in place.   Plan cont for now   Isabela Plan: Keep arterial line for hemodynamic monitoring, frequent ABGs and frequent labs    Prophylaxis:  VTE VTE covered by:    Odell Treadwell       Stress Ulcer  not ordered       Subjective HPI/24hr events:     · Filter went down exchanged x 1   · Patient still with metabolic acidosis but oxygenation improved to PO2 86 on last ABG her pulse ox is 96%  · Platelet count down to 41 DC'd heparin shots      ROS not performed      Objective                     pt is on bipap           Vitals I/O      Most Recent Min/Max in 24hrs   Temp (!) 97.2 °F (36.2 °C) Temp  Min: 96.3 °F (35.7 °C)  Max: 98.1 °F (36.7 °C)   Pulse 78 Pulse  Min: 69  Max: 99   Resp (!) 44 Resp  Min: 33  Max: 50   /82 BP  Min: 68/40  Max: 179/87   O2 Sat 96 % SpO2  Min: 82 %  Max: 98 %      Intake/Output Summary (Last 24 hours) at 9/15/2023 0444  Last data filed at 9/15/2023 0300  Gross per 24 hour   Intake 2664.88 ml   Output 6640 ml   Net -3975.12 ml         Diet NPO     Invasive Monitoring Physical exam   Arterial Line  Eitzen /50  Arterial Line BP  Min: 83/44  Max: 181/76   MAP 74 mmHg  Arterial Line MAP (mmHg)  Min: 59 mmHg  Max: 121 mmHg    Physical Exam  Eyes:      Comments: Left eye small subconjunctival hemorrhage   Skin:     General: Skin is warm and dry. Coloration: Skin is pale. Cardiovascular:      Rate and Rhythm: Normal rate and regular rhythm. Heart sounds: Normal heart sounds. Musculoskeletal:      Right lower leg: 3+ Edema present. Left lower leg: 3+ Edema present. Comments: Bilateral arm ecchymosis   Abdominal:      General: There is distension. Palpations: Abdomen is soft. Comments: Ecchymosis from heparin shot   Constitutional:       Appearance: She is well-developed. She is morbidly obese. She is ill-appearing and toxic-appearing. Interventions: Face mask in place. Pulmonary:      Effort: Tachypnea, accessory muscle usage and accessory muscle usage present. Breath sounds: Examination of the right-lower field reveals decreased breath sounds. Examination of the left-lower field reveals decreased breath sounds. Decreased breath sounds present.       Comments: Coarse breath sounds throughout on BiPAP  Neurological:      Mental Status: She is lethargic, CAM ICU positive , calm, disoriented to person, disoriented to place, disoriented to time and disoriented to situation. Genitourinary/Anorectal:  FoleyVitals and nursing note reviewed. Diagnostic Studies      EKG: NSR  Imaging:  I have personally reviewed pertinent reports.        Medications:  Scheduled PRN   Albumin 25%, 25 g, TID  chlorhexidine, 15 mL, Q12H [address]  glycerin-hypromellose-, 2 drop, TID  insulin lispro, 2-12 Units, Q6H  octreotide, 100 mcg, Q8H LANDEN  piperacillin-tazobactam, 3.375 g, Q8H  vancomycin, 15 mg/kg (Adjusted), Q24H      ondansetron, 4 mg, Q8H PRN       Continuous    epoprostenol, 6.25-50 ng/kg/min (Ideal), Last Rate: 6.494 ng/kg/min (09/15/23 0421)  norepinephrine, 1-30 mcg/min, Last Rate: 24 mcg/min (09/15/23 0300)  NxStage K 4/Ca 3, 20,000 mL  sodium bicarbonate 150 mEq in dextrose 5 % 1,000 mL infusion, 75 mL/hr, Last Rate: 75 mL/hr (09/15/23 0411)  vasopressin, 0.04 Units/min, Last Rate: 0.04 Units/min (09/15/23 0230)         Labs:    CBC    Recent Labs     09/14/23  0425 09/15/23  0444   WBC 22.70* 15.73*   HGB 11.7 10.5*   HCT 37.0 33.0*   * 41*   BANDSPCT 1  --      BMP    Recent Labs     09/14/23  1559 09/14/23  2254   SODIUM 140 141   K 4.8 4.2    107   CO2 19* 17*   AGAP 16 17   BUN 53* 52*   CREATININE 2.26* 2.38*   CALCIUM 8.9 8.6       Coags    Recent Labs     09/14/23  0024 09/14/23  0425   INR 2.00* 2.17*        Additional Electrolytes  Recent Labs     09/14/23  1559 09/14/23  2254 09/15/23  0412   MG 2.1 2.0  --    PHOS 4.4* 3.3  --    CAIONIZED 1.13 1.11* 1.12          Blood Gas    Recent Labs     09/15/23  0412   PHART 7.278*   YOI2JUA 42.5   PO2ART 86.7   AEJ6ZWJ 19.4*   BEART -7.0   SOURCE Line, Arterial     Recent Labs     09/13/23  2245 09/14/23 0214 09/15/23  0412   PHVEN 7.225*  --   --    VGG0JVU 46.0  --   --    PO2VEN 38.0  --   -- FLG0LLN 18.6*  --   --    BEVEN -8.8  --   --    SOURCE  --    < > Line, Arterial    < > = values in this interval not displayed.     LFTs  Recent Labs     09/13/23  0534 09/14/23  0425   ALT 44 45   AST 88* 95*   ALKPHOS 105* 116*   ALB 2.9* 3.7   TBILI 1.98* 2.82*       Infectious  Recent Labs     09/14/23  0957   PROCALCITONI 1.88*     Glucose  Recent Labs     09/14/23  0425 09/14/23  0957 09/14/23  1559 09/14/23  2254   GLUC 162* 160* 162* 152*                   Johnny Pizano PA-C

## 2023-09-15 NOTE — ASSESSMENT & PLAN NOTE
Secondary to liver cirrhosis d/t HERNANDEZ   CT Abdomen: Large volume ascites. Extensive bilateral patchy airspace consolidation in the mid and lower lung zones.  B/L Flank edema   S/p paracentesis 09/13    · Discontinue diuresis in the setting of KANDACE/shock  · Nephrology consulted   · Albumin and Octreotide started  · Vanc/zosyn for empiric SBP coverage  · Made CC

## 2023-09-15 NOTE — ASSESSMENT & PLAN NOTE
· Baseline: 0.82   · Cr. On admission: 2.05   · Initially given IVF fluids when suspected pre-renal however that worsened respiratory status. · Clinically volume overload   · Worsening Cr. And anuria     Plan:   · Nephrology consulted   · Octreotide and bicarb gtt started   · Concern for hepatorenal syndrome as cause for worsening KANDACE   · Bicarb gtt at 50 mls per hour   · Albumin 25 % q 6 hours  · Trend Cr. · Trend BMP q 6 hours   · Monitor I/O   · Daily Weight   · started CRRT for volume removal/ metabolic acidosis in the setting of anuric renal failure.   · Electrolytes stable

## 2023-09-15 NOTE — ASSESSMENT & PLAN NOTE
Volume overload versus pneumonia  CT Chest: Extensive bilateral patchy airspace consolidation in the mid and lower lung zones as detailed above. Correlate with clinical findings for disseminated infectious etiology versus edema/ARDS. Large volume ascites. Findings of cirrhosis and portal hypertension as described. Bilateral flank edema.     · Continue vanc/zosyn   · Continue BiPAP, wean as tolerated for SpO2 > 92%  · Veletri d/c 09/15  · Remains DNI/DNI  · Made CC

## 2023-09-15 NOTE — RESPIRATORY THERAPY NOTE
RT Ventilator Management Note  Page Carpenter 68 y.o. female MRN: [id number]  Unit/Bed#: -01 Encounter: [id number]      Daily Screen    No data found in the last 10 encounters. Physical Exam:          Resp Comments: assumed care of patient at 1900 after report from day shift therpist.Patient remained on BiPAP at physician's settings. Patient remained on Veletri. ABGs drawn via A-line Q4, CO2 closer to matching HCO3 to normalize pH.

## 2023-09-15 NOTE — ASSESSMENT & PLAN NOTE
· Unclear etiololgy   · DDX: PNA vs SBP   · WBC: 13.5 - 16.5  · LA: 1.9   · RR:32//Temp: 97.3     Plan:   · BCx2 Pend   · Paracentesis 3.5 liters cultures are negative so far   · Broadened to Zoysn from Ceftriaxone added vancomycin   · MRSA +   · Blood cultures - Neg so far   · Trend fever and WBC   · Support respiratory effort as mentioned above.   · Procal daily   · UA: negative

## 2023-09-15 NOTE — ASSESSMENT & PLAN NOTE
Lab Results   Component Value Date    HGBA1C 6.4 (H) 02/20/2021       Recent Labs     09/14/23  0024 09/14/23  1210 09/14/23  1758 09/14/23  2353   POCGLU 168* 155* 161* 168*       Blood Sugar Average: Last 72 hrs:  (P) 881.8631429226290582     · Continue Q6h FSBS, SSI PRN

## 2023-09-15 NOTE — RESPIRATORY THERAPY NOTE
RT Protocol Note  Hong Pablo 68 y.o. female MRN: [id number]  Unit/Bed#: -01 Encounter: [id number]    Assessment    Principal Problem:    KANDACE (acute kidney injury) ([address])  Active Problems:    Class 3 severe obesity in adult [facility])    Decompensated hepatic cirrhosis ([address])    Metabolic encephalopathy    Anxiety    Type 2 diabetes mellitus, without long-term current use of insulin (HCC)    Asthma    Hypertension    Ascites    Hepatorenal syndrome ([address])    Acute metabolic acidosis    Acute respiratory failure with hypoxia (HCC)    Septic shock (HCC)    Oliguria and anuria    Encounter for continuous venovenous hemodiafiltration (CVVHD) ([facility])      Home Pulmonary Medications:         Past Medical History:   Diagnosis Date    Diabetes mellitus ([address])      Social History     Socioeconomic History    Marital status: /Civil Union     Spouse name: None    Number of children: None    Years of education: None    Highest education level: None   Occupational History    None   Tobacco Use    Smoking status: Never    Smokeless tobacco: Never   Vaping Use    Vaping Use: Never used   Substance and Sexual Activity    Alcohol use: Not Currently    Drug use: Not Currently    Sexual activity: Not Currently   Other Topics Concern    None   Social History Narrative    None     Social Determinants of Health     Financial Resource Strain: Not on file   Food Insecurity: No Food Insecurity (9/13/2023)    Hunger Vital Sign     Worried About Running Out of Food in the Last Year: Never true     Ran Out of Food in the Last Year: Never true   Transportation Needs: No Transportation Needs (9/13/2023)    PRAPARE - Transportation     Lack of Transportation (Medical): No     Lack of Transportation (Non-Medical):  No   Physical Activity: Not on file   Stress: Not on file   Social Connections: Not on file   Intimate Partner Violence: Not on file   Housing Stability: Low Risk  (9/13/2023)    Housing Stability Vital Sign     Unable to Pay for Housing in the Last Year: No     Number of Places Lived in the Last Year: 2     Unstable Housing in the Last Year: No       Subjective         Objective    Physical Exam:   O2 Device: (P) BiPAP 14/8/100%    Vitals:  Blood pressure 100/71, pulse (!) 137, temperature (!) 97.3 °F (36.3 °C), temperature source Axillary, resp. rate (!) 40, height 5' 7" (1.702 m), weight 113 kg (249 lb 9 oz), SpO2 96 %. Results from last 7 days   Lab Units 09/15/23  0412   PH ART  7.278*   PCO2 ART mm Hg 42.5   PO2 ART mm Hg 86.7   HCO3 ART mmol/L 19.4*   BASE EXC ART mmol/L -7.0   O2 CONTENT ART mL/dL 15.5*   O2 HGB, ARTERIAL % 95.2   ABG SOURCE  Line, Arterial   SOSA TEST  Yes       Imaging and other studies: I have personally reviewed pertinent reports. O2 Device: (P) BiPAP 14/8/100%     Plan             Resp Comments: (P) Pt BiPAP settings decreased to 14/8/100%, pt tolerated well. Plan for pt to transition to comfort care after brother arrives this evening from another state.

## 2023-09-15 NOTE — NURSING NOTE
Assessed pt via [place] for midline placement. Left arm with small, deep veins. One very superficial vein above the anticub; vein too superficial for midline. Right arm not assessed due to +3 edema. 20g IV placed in left forearm. IV site flushed briskly with 30 ml NS without any noted infiltration.

## 2023-09-16 LAB
BACTERIA SPEC BFLD CULT: NO GROWTH
GRAM STN SPEC: NORMAL
GRAM STN SPEC: NORMAL

## 2023-09-16 NOTE — DISCHARGE SUMMARY
[address] [facility]  Discharge- Selina Flavors 1950, 68 y.o. female MRN: [id number]  Unit/Bed#: -01 Encounter: [id number]  Primary Care Provider: Shahnaz Simms DO   Date and time admitted to hospital: 9/12/2023 12:43 PM    Septic shock [facility])  Assessment & Plan  In setting of PNA vs SBP, mixed etiology likely largely 2/2 decompensated cirrhosis/metabolic acidosis/RV dilation and sepsis  Blood cx-   Paracentesis 09/13 w/ negative cx thus far, however high risk for SBP  MRSA screen +  UA -  CT w/ ARDS vs multifocal PNA    · Continue vanc/zosyn D#4  · Levophed, vasopressin, and epi gtt; wean as tolerated for MAP > 65  · Added stress dose steroids 09/15  · Trend fever and WBC   · maxed out ob levophed / vasopressin and epi   · Made CC at 1930      Acute respiratory failure with hypoxia ([address])  Assessment & Plan  Volume overload versus pneumonia  CT Chest: Extensive bilateral patchy airspace consolidation in the mid and lower lung zones as detailed above. Correlate with clinical findings for disseminated infectious etiology versus edema/ARDS. Large volume ascites. Findings of cirrhosis and portal hypertension as described. Bilateral flank edema. · Continue vanc/zosyn   · Continue BiPAP, wean as tolerated for SpO2 > 92%  · Veletri d/c 09/15  · Remains DNI/DNI  · Made CC    Decompensated hepatic cirrhosis (HCC)  Assessment & Plan  Known liver cirrhosis d/t HERNANDEZ   CT abdomen with concern for large vL ascites. Worsening mentation and respiratory status  MELD: 19   S/p paracentesis 09/13; 3.5 L removed, pre-scott cx negative    · Consult to GI and Nephrology   · Iain albumin and octreotide   · On CRRT  · F/u paracentesis cx- remains on vanc/cefepime  · Made CC    * KANDACE (acute kidney injury) ([address])  Assessment & Plan  Concern for hepatorenal syndrome in setting of HERNANDEZ cirrhosis, complicated by pre-renal state in setting of sepsis  Baseline 0.82;  Cr on admission 2.05   CRRT started 09/13  Clinically volume overload   Worsening Cr And anuria     · Nephrology consulted- CRRT stopped 09/15 for worsening shock/arrhythmias; no plan to re-initiate after additional GOC  · Made CC  · Continue octreotide and cyrus albumin  · Bicarb gtt d/c  · BMP/lytes and VBG q6h  · Monitor I/O   · Daily Weight     Acute metabolic acidosis  Assessment & Plan  · See A/P for KANDACE    Hepatorenal syndrome [facility])  Assessment & Plan  · See plan for KANDACE  · Made CC      Ascites  Assessment & Plan  Secondary to liver cirrhosis d/t HERNANDEZ   CT Abdomen: Large volume ascites. Extensive bilateral patchy airspace consolidation in the mid and lower lung zones. B/L Flank edema   S/p paracentesis 09/13    · Discontinue diuresis in the setting of KANDACE/shock  · Nephrology consulted   · Albumin and Octreotide started  · Vanc/zosyn for empiric SBP coverage  · Made CC      Hypertension  Assessment & Plan  · Holding antihypertensive agents   · Made CC     Asthma  Assessment & Plan  No PFTs on file    · Nebs PRN   · Made CC    Type 2 diabetes mellitus, without long-term current use of insulin [facility])  Assessment & Plan  Lab Results   Component Value Date    HGBA1C 6.4 (H) 02/20/2021       Recent Labs     09/14/23  0024 09/14/23  1210 09/14/23  1758 09/14/23  2353   POCGLU 168* 155* 161* 168*       Blood Sugar Average: Last 72 hrs:  (P) [id number]     · Continue Q6h FSBS, SSI PRN    Anxiety  Assessment & Plan  · Continue Zoloft when able to PO   · Made CC    Metabolic encephalopathy  Assessment & Plan  Worsening confusion over the 2 weeks leading to admission  Likely secondary to elevated ammonia level and metabolic acidosis in the setting of sepsis.   CTH similar to prior    · Lactose d/c as ammonia normalized  · Trend ammonia level  · Neuro check Q2  · Made CC        Class 3 severe obesity in adult [facility])  Assessment & Plan  · Consult on lifestyle modifications when appropriate  · Made CC          Medical Problems     Resolved Problems  Date Reviewed: 9/15/2023   None         Admission Date:   Admission Orders (From admission, onward)     Ordered        [id number]  Once                        Admitting Diagnosis: Dehydration [E86.0]  Abnormal laboratory test [R89.9]  KANDACE (acute kidney injury) ([address]) [N17.9]  Ascites [R18.8]    HPI: Patient was admitted for worsening kidney function and shortness of breath    Procedures Performed:   Orders Placed This Encounter   Procedures   • ED ECG Documentation Only   • Temporary HD Catheter       Summary of Hospital Course:   Patient was admitted to the [facility] service for IV rehydration, CT showed bilateral patchy areas consistent with pulmonary versus edema as well large-volume ascites. Patient was transferred after the 24 hours critical care service worsening metabolic acidosis hypoxic failure with sepsis and acute renal failure with possible hepatorenal syndrome. Nephrology was involved added bicarb drip albumin and octreotide. Patient continues to be in respiratory failure with breath rates in the 40s continuing worsening metabolic acidosis CRRT was initiated. Patient is a DNR/DNI and was maxed out on BiPAP, she was tolerating CRRT for 24 hrs with no improvement. Patient went into an arrhythmia with A-fib V. tach received milrinone bolus and amiodarone drip. Patient was made comfort care 1930 with family at bedside.   Time of death 19:52    Significant Findings, Care, Treatment and Services Provided:   HD cath  CRRT      Complications: NA    Final Diagnosis: Respiratory failure with hypoxia and hypercapnia, hepatorenal syndrome    Date, Time and Cause of Death    Date of Death: 9/15/23  Time of Death:  7:52 PM  Preliminary Cause of Death: Respiratory failure with hypoxia and hypercapnia ([address])  Entered by: sarah[HO1.1]     Attribution     HO1.1 Moi Banks PA-C 09/15/23 19:55          PCP: Malcolm Lee DO    Disposition:

## 2023-09-17 LAB
BACTERIA BLD CULT: NORMAL
BACTERIA BLD CULT: NORMAL

## 2023-09-17 NOTE — DEATH NOTE
INPATIENT DEATH NOTE  Lauren Ramirez 68 y.o. female MRN: [id number]  Unit/Bed#: -01 Encounter: [id number]    Date, Time and Cause of Death    Date of Death: 9/15/23  Time of Death:  7:52 PM  Preliminary Cause of Death: Respiratory failure with hypoxia and hypercapnia ([address])  Entered by: harinder[HO1.1]     Attribution     HO1.1 Johnny HARRIS AMMY Pizano 09/15/23 19:55           Patient's Information  Pronounced by: MAIRA Pizano PA-C  Did the patient's death occur in the ED?: No  Did the patient's death occur in the OR?: No  Did the patient's death occur less than 10 days post-op?: No  Did the patient's death occur within 24 hours of admission?: No  Was code status DNR at the time of death?: Yes    PHYSICAL EXAM:  Unresponsive to noxious stimuli, Spontaneous respirations absent, Breath sounds absent, Carotid pulse absent, Heart sounds absent, Pupillary light reflex absent and Corneal blink reflex absent    Medical Examiner notification criteria:  NONE APPLICABLE   Medical Examiner's office notified?:  Yes   Medical Examiner accepted case?:  No  Name of Medical Examiner:     Family Notification  Was the family notified?: Yes  Date Notified: 09/15/23  Time Notified:   Notified by: Pia Sparks. Harinder GIMENEZ  Name of Family Notified of Death: Lorena Adams   Relationship to Patient: Brother  Family Notification Route:  At bedside  Was the family told to contact a  home?: Yes  Name of [facility][de-identified] [address] [facility]    Autopsy Options:  Post-mortem examination declined by next of kin    Primary Service Attending Physician notified?:  yes - Attending:  No att. providers found    Physician/Resident responsible for completing Discharge Summary:  harinder